# Patient Record
Sex: FEMALE | URBAN - METROPOLITAN AREA
[De-identification: names, ages, dates, MRNs, and addresses within clinical notes are randomized per-mention and may not be internally consistent; named-entity substitution may affect disease eponyms.]

---

## 2023-06-27 ENCOUNTER — TELEPHONE (OUTPATIENT)
Dept: PAIN MANAGEMENT | Age: 63
End: 2023-06-27

## 2023-06-30 LAB
ERYTHROCYTE DISTRIBUTION WIDTH (RATIO) BY AUTOMATED COUNT: 12.8 % (ref 11.5–14.5)
ERYTHROCYTE MEAN CORPUSCULAR HEMOGLOBIN CONCENTRATION (G/DL) BY AUTOMATED: 33.3 G/DL (ref 32–36)
ERYTHROCYTE MEAN CORPUSCULAR VOLUME (FL) BY AUTOMATED COUNT: 93 FL (ref 80–100)
ERYTHROCYTE [DISTWIDTH] IN BLOOD BY AUTOMATED COUNT: 12.8 % (ref 11.5–14.5)
ERYTHROCYTES (10*6/UL) IN BLOOD BY AUTOMATED COUNT: 4.46 X10E12/L (ref 4–5.2)
HCT VFR BLD CALC: 41.5 % (ref 36–46)
HEMATOCRIT (%) IN BLOOD BY AUTOMATED COUNT: 41.5 % (ref 36–46)
HEMOGLOBIN (G/DL) IN BLOOD: 13.8 G/DL (ref 12–16)
HEMOGLOBIN: 13.8 G/DL (ref 12–16)
LEUKOCYTES (10*3/UL) IN BLOOD BY AUTOMATED COUNT: 8.8 X10E9/L (ref 4.4–11.3)
MCHC RBC AUTO-ENTMCNC: 33.3 G/DL (ref 32–36)
MCV RBC AUTO: 93 FL (ref 80–100)
PLATELET # BLD: 368 X10E9/L (ref 150–450)
PLATELETS (10*3/UL) IN BLOOD AUTOMATED COUNT: 368 X10E9/L (ref 150–450)
RBC # BLD: 4.46 X10E12/L (ref 4–5.2)
WBC: 8.8 X10E9/L (ref 4.4–11.3)

## 2023-07-10 ENCOUNTER — INITIAL CONSULT (OUTPATIENT)
Dept: PAIN MANAGEMENT | Age: 63
End: 2023-07-10
Payer: COMMERCIAL

## 2023-07-10 VITALS
BODY MASS INDEX: 28.32 KG/M2 | WEIGHT: 170 LBS | TEMPERATURE: 97.1 F | DIASTOLIC BLOOD PRESSURE: 86 MMHG | SYSTOLIC BLOOD PRESSURE: 136 MMHG | HEIGHT: 65 IN

## 2023-07-10 DIAGNOSIS — M70.71 ISCHIAL BURSITIS OF RIGHT SIDE: Primary | ICD-10-CM

## 2023-07-10 DIAGNOSIS — M79.604 RIGHT LEG PAIN: ICD-10-CM

## 2023-07-10 PROCEDURE — 3017F COLORECTAL CA SCREEN DOC REV: CPT | Performed by: PHYSICAL MEDICINE & REHABILITATION

## 2023-07-10 PROCEDURE — G8419 CALC BMI OUT NRM PARAM NOF/U: HCPCS | Performed by: PHYSICAL MEDICINE & REHABILITATION

## 2023-07-10 PROCEDURE — G8427 DOCREV CUR MEDS BY ELIG CLIN: HCPCS | Performed by: PHYSICAL MEDICINE & REHABILITATION

## 2023-07-10 PROCEDURE — 4004F PT TOBACCO SCREEN RCVD TLK: CPT | Performed by: PHYSICAL MEDICINE & REHABILITATION

## 2023-07-10 PROCEDURE — 99204 OFFICE O/P NEW MOD 45 MIN: CPT | Performed by: PHYSICAL MEDICINE & REHABILITATION

## 2023-07-10 RX ORDER — SUMATRIPTAN 50 MG/1
TABLET, FILM COATED ORAL
COMMUNITY
Start: 2023-06-20

## 2023-07-10 RX ORDER — CLONAZEPAM 0.5 MG/1
0.5 TABLET ORAL 2 TIMES DAILY
COMMUNITY
Start: 2023-05-09

## 2023-07-10 RX ORDER — PANTOPRAZOLE SODIUM 40 MG/1
TABLET, DELAYED RELEASE ORAL
COMMUNITY
Start: 2023-06-29

## 2023-07-10 RX ORDER — METHYLPREDNISOLONE 4 MG/1
TABLET ORAL
COMMUNITY
Start: 2023-06-28

## 2023-07-10 RX ORDER — GALCANEZUMAB 120 MG/ML
INJECTION, SOLUTION SUBCUTANEOUS
COMMUNITY
Start: 2023-06-22

## 2023-07-10 RX ORDER — TIZANIDINE 4 MG/1
4 TABLET ORAL 3 TIMES DAILY
COMMUNITY
Start: 2023-05-09

## 2023-07-10 RX ORDER — FLUOXETINE HYDROCHLORIDE 40 MG/1
40 CAPSULE ORAL DAILY
COMMUNITY
Start: 2023-06-20

## 2023-07-10 RX ORDER — CETIRIZINE HYDROCHLORIDE 10 MG/1
1 TABLET ORAL DAILY
COMMUNITY
Start: 2011-01-10

## 2023-07-10 RX ORDER — LINACLOTIDE 145 UG/1
CAPSULE, GELATIN COATED ORAL
COMMUNITY
Start: 2023-06-30

## 2023-07-10 RX ORDER — GABAPENTIN 100 MG/1
CAPSULE ORAL
COMMUNITY
Start: 2023-04-12

## 2023-07-10 RX ORDER — ZOLMITRIPTAN 2.5 MG/1
TABLET, FILM COATED ORAL
COMMUNITY
Start: 2023-06-20

## 2023-07-10 ASSESSMENT — ENCOUNTER SYMPTOMS
BACK PAIN: 1
DIARRHEA: 0
CONSTIPATION: 0
SHORTNESS OF BREATH: 0
NAUSEA: 0

## 2023-07-10 NOTE — PROGRESS NOTES
Efrem Starr  (1960)    7/10/2023    Subjective:     Efrem Starr is 61 y.o. female who complains today of:    Chief Complaint   Patient presents with    Consultation    Back Pain     Lower Right    Leg Pain     Right thigh        Efrem Starr is a 61 y.o. female who presents for evaluation by request of Leyda Frank for L5/S1 epidurals. She is accompanied by her spouse Maurice Horner whom she permits to be present during the history and exam.    She has struggled with pain for over 20 years, worse over the last 6 months. She denies any immediately-preceding traumatic or inciting events. She has been previously evaluated by Leyda Frank whose records are reviewed below. She describes pain located in the right low back and down the right posterior thigh. Left leg is ok. The pain is located deep within her right buttock and makes it hard for her to sit or put any pressure on her right side of her buttocks. Pain is a constant ache and is currently a 5/10 and gets up to a 9/10 at its worst and goes down to a 4/10 at its best. Pain is worse with bending and sitting. Pain is better with laying down. Pain is located 90% on the right and 10% on the left. Pain is located 80% in the back and 20% in the legs. She denies any numbness, tingling, weakness, bowel or bladder dysfunction, saddle anesthesia, falls, unexplained weight loss, persistent night pain and sweats, fever, IV drug abuse, immunocompromise, chronic prednisone or antibiotic use, or any other red flag symptoms. Mood is down, denies any suicidal or homicidal ideation. Sleep is poor, awakes fatigued.     She has tried:  Home exercise program with minimal relief  [facility] as well as [facility]  Lumbar spine surgery 1998 Dr Lloyd Quevedo in [address] [place] [address] testing previously performed includes XRs MRI    Medications tried include:  Acetaminophen with minimal relief for over 3 months  Ibuprofen with minimal

## 2023-07-12 ENCOUNTER — PROCEDURE VISIT (OUTPATIENT)
Dept: PAIN MANAGEMENT | Age: 63
End: 2023-07-12
Payer: COMMERCIAL

## 2023-07-12 DIAGNOSIS — M70.71 ISCHIAL BURSITIS OF RIGHT SIDE: Primary | ICD-10-CM

## 2023-07-12 PROCEDURE — 20610 DRAIN/INJ JOINT/BURSA W/O US: CPT | Performed by: PHYSICAL MEDICINE & REHABILITATION

## 2023-07-12 PROCEDURE — 77002 NEEDLE LOCALIZATION BY XRAY: CPT | Performed by: PHYSICAL MEDICINE & REHABILITATION

## 2023-07-12 RX ORDER — BETAMETHASONE SODIUM PHOSPHATE AND BETAMETHASONE ACETATE 3; 3 MG/ML; MG/ML
3 INJECTION, SUSPENSION INTRA-ARTICULAR; INTRALESIONAL; INTRAMUSCULAR; SOFT TISSUE ONCE
Status: COMPLETED | OUTPATIENT
Start: 2023-07-12 | End: 2023-07-12

## 2023-07-12 RX ORDER — LIDOCAINE HYDROCHLORIDE 10 MG/ML
5 INJECTION, SOLUTION INFILTRATION; PERINEURAL ONCE
Status: COMPLETED | OUTPATIENT
Start: 2023-07-12 | End: 2023-07-12

## 2023-07-12 RX ADMIN — Medication 0.5 MEQ: at 11:11

## 2023-07-12 RX ADMIN — LIDOCAINE HYDROCHLORIDE 5 ML: 10 INJECTION, SOLUTION INFILTRATION; PERINEURAL at 11:12

## 2023-07-12 RX ADMIN — BETAMETHASONE SODIUM PHOSPHATE AND BETAMETHASONE ACETATE 3 MG: 3; 3 INJECTION, SUSPENSION INTRA-ARTICULAR; INTRALESIONAL; INTRAMUSCULAR; SOFT TISSUE at 11:11

## 2023-07-12 NOTE — PROGRESS NOTES
Patient Name: Artem Velasquez   : 1960     Date: 2023   Provider: Domenica Collier MD        PROCEDURE: Right ischial bursa corticosteroid injection under fluoroscopic guidance     INDICATIONS: Artem Velasquez is a 61 y.o. y.o.  female who presents with symptoms and physical exam findings consistent with Right ischial bursitis. She has had persistent pain that limits her of daily living such as lower body dressing. The pain is persistent despite conservative measures including home exercise program. Given her symptoms, physical exam findings, impairment in activities of daily living, and lack of response to conservative measures, consideration for Right ischial bursa corticosteroid injection under fluoroscopic guidance was given. Discussed the risks including but not limited to bleeding, infection, worsened pain, damage to surrounding structures, side effects, toxicity, allergic reactions to medications used, immune and stress-response dysfunction, fat necrosis, decreased bone mineralization, tendon rupture, cartilage loss, increased fracture risk, avascular necrosis, skin pigmentation changes, blood sugar elevation, need for surgery, premature damage or degeneration of the area, as well as catastrophic injury such as vision loss, paralysis, stroke, plexus injury, bowel or bladder perforation and/or incontinence, ventilator dependence, loss of the limbs, and death. Discussed the risks, benefits, and alternatives to the procedure including no procedure at all. Discussed that we cannot undo any permanent neurologic or orthopaedic damage or change the course of any underlying disease. After thorough discussion, patient expressed understanding and willingness to proceed. Written consent was obtained and is in the chart. Verbal consent to proceed was obtained. DESCRIPTION OF PROCEDURE:  The site was marked. A time-out was performed.  Fluoroscopy was used to visualize pertinent anatomy and identify a tract

## 2023-07-17 ENCOUNTER — OFFICE VISIT (OUTPATIENT)
Dept: PAIN MANAGEMENT | Age: 63
End: 2023-07-17
Payer: COMMERCIAL

## 2023-07-17 VITALS
SYSTOLIC BLOOD PRESSURE: 130 MMHG | BODY MASS INDEX: 28.32 KG/M2 | WEIGHT: 170 LBS | DIASTOLIC BLOOD PRESSURE: 78 MMHG | TEMPERATURE: 98.1 F | HEIGHT: 65 IN

## 2023-07-17 DIAGNOSIS — M46.1 SACROILIITIS (HCC): Primary | ICD-10-CM

## 2023-07-17 PROCEDURE — G8427 DOCREV CUR MEDS BY ELIG CLIN: HCPCS | Performed by: NURSE PRACTITIONER

## 2023-07-17 PROCEDURE — G8419 CALC BMI OUT NRM PARAM NOF/U: HCPCS | Performed by: NURSE PRACTITIONER

## 2023-07-17 PROCEDURE — 4004F PT TOBACCO SCREEN RCVD TLK: CPT | Performed by: NURSE PRACTITIONER

## 2023-07-17 PROCEDURE — 99214 OFFICE O/P EST MOD 30 MIN: CPT | Performed by: NURSE PRACTITIONER

## 2023-07-17 PROCEDURE — 3017F COLORECTAL CA SCREEN DOC REV: CPT | Performed by: NURSE PRACTITIONER

## 2023-07-17 ASSESSMENT — ENCOUNTER SYMPTOMS
COUGH: 0
DIARRHEA: 0
GASTROINTESTINAL NEGATIVE: 1
CONSTIPATION: 0
TROUBLE SWALLOWING: 0
BACK PAIN: 1
EYES NEGATIVE: 1
SHORTNESS OF BREATH: 0

## 2023-10-02 ENCOUNTER — SPECIALTY PHARMACY (OUTPATIENT)
Dept: PHARMACY | Facility: CLINIC | Age: 63
End: 2023-10-02

## 2023-10-04 ENCOUNTER — PHARMACY VISIT (OUTPATIENT)
Dept: PHARMACY | Facility: CLINIC | Age: 63
End: 2023-10-04
Payer: COMMERCIAL

## 2023-10-04 PROCEDURE — RXMED WILLOW AMBULATORY MEDICATION CHARGE

## 2023-10-04 NOTE — PROGRESS NOTES
"Patient address: [id number] [facility] [address]  Patient Medications: Emgality    Medication delivery date: 10/6, Fri.    Please create a specialty pharmacy encounter.  Please link the encounter to an outreach reason of \"Refill co ordination outreach\"  Open the specialty pharmacy encounter and please update the refill question section and the delivery section.  Route the encounter to a PSL pool for a technician who can process the script.    I called this patient to reschedule a delivery of Emgality to the confirmed address on [facility] on 10/6, Fri. The patient is overdue for an injection only because she was out of town.       Demetria Hurley  Specialty Pharmacy Technician        "

## 2023-10-16 ENCOUNTER — PROCEDURE VISIT (OUTPATIENT)
Dept: PAIN MANAGEMENT | Age: 63
End: 2023-10-16
Payer: COMMERCIAL

## 2023-10-16 DIAGNOSIS — M79.604 RIGHT LEG PAIN: Primary | ICD-10-CM

## 2023-10-16 DIAGNOSIS — M70.71 ISCHIAL BURSITIS OF RIGHT SIDE: ICD-10-CM

## 2023-10-16 DIAGNOSIS — S76.311D STRAIN OF RIGHT HAMSTRING, SUBSEQUENT ENCOUNTER: ICD-10-CM

## 2023-10-16 PROCEDURE — 95886 MUSC TEST DONE W/N TEST COMP: CPT | Performed by: PHYSICAL MEDICINE & REHABILITATION

## 2023-10-16 PROCEDURE — 95911 NRV CNDJ TEST 9-10 STUDIES: CPT | Performed by: PHYSICAL MEDICINE & REHABILITATION

## 2023-10-16 NOTE — PROGRESS NOTES
Electromyography (EMG)/Nerve conduction studies (NCS) Report: Lower Extremity    Name: Santos Loco   YOB: 1960  Date of Service: 10/16/2023   Provider: Say Nelosn MD        INDICATIONS:  Santos Loco is a 61 y.o. female who presents for electrodiagnostic evaluation for right leg pain. She confirms a history of lumbar spine surgery. Both limbs are necessary to examine in order to evaluate for any evidence of systemic disease as well as establish normal baseline values from which to compare any abnormal unilateral findings. The study is explained and verbal consent to proceed is obtained. NERVE CONDUCTION STUDIES:  Sensory nerve conduction studies: Bilateral sural and superficial peroneal sensory nerve conduction studies demonstrate normal peak latencies and amplitudes. Bilateral lower limb temperatures are normal.    Motor nerve conduction studies: Bilateral peroneal motor nerve conduction studies with pickup over the extensor digitorum brevis demonstrate normal distal latencies and amplitudes. Bilateral tibial motor nerve conduction studies with pickup over the abductor hallucis demonstrate normal distal latencies and amplitudes. H reflex: Bilateral H reflexes are symmetric and not prolonged. ELECTROMYOGRAPHY: A disposable monopolar needle is used to evaluate bilateral vastus medialis, tibialis anterior, extensor hallucis longus, flexor digitorum longus, peroneus longus, medial gastrocnemius, and lateral gastrocnemius. All of the muscles sampled are free of any increased insertional activity or any abnormal spontaneous activity. Motor unit recruitment is unremarkable. Lumbar paraspinal muscle sampling is deferred given history of lumbar spine surgery. SUMMARY:  This study is normal. There is no current electrodiagnostic evidence for an active bilateral lumbosacral motor radiculopathy or generalized large fiber sensorimotor peripheral polyneuropathy.      RECOMMENDATIONS: Today's

## 2023-10-16 NOTE — PROGRESS NOTES
Refer to physical therapy for right ischial bursitis and right hamstring strain, patient requests external physical therapy

## 2023-10-19 ENCOUNTER — SPECIALTY PHARMACY (OUTPATIENT)
Dept: PHARMACY | Facility: CLINIC | Age: 63
End: 2023-10-19

## 2023-10-31 ENCOUNTER — PHARMACY VISIT (OUTPATIENT)
Dept: PHARMACY | Facility: CLINIC | Age: 63
End: 2023-10-31
Payer: COMMERCIAL

## 2023-10-31 ENCOUNTER — SPECIALTY PHARMACY (OUTPATIENT)
Dept: PHARMACY | Facility: CLINIC | Age: 63
End: 2023-10-31

## 2023-10-31 PROCEDURE — RXMED WILLOW AMBULATORY MEDICATION CHARGE

## 2023-11-27 ENCOUNTER — SPECIALTY PHARMACY (OUTPATIENT)
Dept: PHARMACY | Facility: CLINIC | Age: 63
End: 2023-11-27

## 2023-11-27 ENCOUNTER — PHARMACY VISIT (OUTPATIENT)
Dept: PHARMACY | Facility: CLINIC | Age: 63
End: 2023-11-27
Payer: COMMERCIAL

## 2023-11-27 PROCEDURE — RXMED WILLOW AMBULATORY MEDICATION CHARGE

## 2023-12-07 ENCOUNTER — LAB (OUTPATIENT)
Dept: LAB | Facility: LAB | Age: 63
End: 2023-12-07
Payer: COMMERCIAL

## 2023-12-07 ENCOUNTER — ANCILLARY PROCEDURE (OUTPATIENT)
Dept: RADIOLOGY | Facility: CLINIC | Age: 63
End: 2023-12-07
Payer: COMMERCIAL

## 2023-12-07 DIAGNOSIS — E04.1 NONTOXIC SINGLE THYROID NODULE: Primary | ICD-10-CM

## 2023-12-07 DIAGNOSIS — Z12.31 SCREENING MAMMOGRAM FOR BREAST CANCER: ICD-10-CM

## 2023-12-07 LAB — TSH SERPL-ACNC: 1.11 MIU/L (ref 0.44–3.98)

## 2023-12-07 PROCEDURE — 77067 SCR MAMMO BI INCL CAD: CPT | Performed by: RADIOLOGY

## 2023-12-07 PROCEDURE — 77067 SCR MAMMO BI INCL CAD: CPT

## 2023-12-07 PROCEDURE — 84443 ASSAY THYROID STIM HORMONE: CPT

## 2023-12-07 PROCEDURE — 77063 BREAST TOMOSYNTHESIS BI: CPT | Performed by: RADIOLOGY

## 2023-12-13 ENCOUNTER — HOSPITAL ENCOUNTER (OUTPATIENT)
Dept: RADIOLOGY | Facility: EXTERNAL LOCATION | Age: 63
Discharge: HOME | End: 2023-12-13

## 2023-12-21 ENCOUNTER — SPECIALTY PHARMACY (OUTPATIENT)
Dept: PHARMACY | Facility: CLINIC | Age: 63
End: 2023-12-21

## 2023-12-21 PROCEDURE — RXMED WILLOW AMBULATORY MEDICATION CHARGE

## 2023-12-26 ENCOUNTER — PHARMACY VISIT (OUTPATIENT)
Dept: PHARMACY | Facility: CLINIC | Age: 63
End: 2023-12-26
Payer: COMMERCIAL

## 2024-01-19 ENCOUNTER — SPECIALTY PHARMACY (OUTPATIENT)
Dept: PHARMACY | Facility: CLINIC | Age: 64
End: 2024-01-19

## 2024-01-19 PROCEDURE — RXMED WILLOW AMBULATORY MEDICATION CHARGE

## 2024-01-23 ENCOUNTER — PHARMACY VISIT (OUTPATIENT)
Dept: PHARMACY | Facility: CLINIC | Age: 64
End: 2024-01-23
Payer: COMMERCIAL

## 2024-01-31 ENCOUNTER — SPECIALTY PHARMACY (OUTPATIENT)
Dept: PHARMACY | Facility: CLINIC | Age: 64
End: 2024-01-31

## 2024-01-31 NOTE — PROGRESS NOTES
[facility] Clinical Note    Kandy Rios is a 64 y.o. female, who is on the specialty pharmacy service for management of: Migraine Core with status of: (Enrolled)     Kandy was contacted on 1/31/2024.    Refer to the encounter summary report for documentation details about patient counseling and education.      Medication Adherence  The importance of adherence was discussed with the patient and they were advised to take the medication as prescribed by their provider. Kandy was encouraged to call her physician's office if they have a question regarding a missed dose.     Conclusion  Rate your quality of life on scale of 1-10: -- (Declined to answer)  Rate your satisfaction with  Specialty Pharmacy on scale of 1-10: 10 - Completely satisfied      Patient advised to contact the pharmacy if there are any changes to her medication list, including prescriptions, OTC medications, herbal products, or supplements. Patient was advised of [facility]’s dispensing process, refill timeline, contact information ([phone]), and patient management follow up. Patient confirmed understanding of education conducted during assessment. All patient questions and concerns were addressed to the best of my ability. Patient was encouraged to contact the specialty pharmacy with any questions or concerns.    Confirmed follow-up outreaches are properly scheduled. Reviewed goals of therapy in the program targets.    Sharmaine Smith, BalaD

## 2024-02-09 ENCOUNTER — SPECIALTY PHARMACY (OUTPATIENT)
Dept: PHARMACY | Facility: CLINIC | Age: 64
End: 2024-02-09

## 2024-02-13 ENCOUNTER — SPECIALTY PHARMACY (OUTPATIENT)
Dept: PHARMACY | Facility: CLINIC | Age: 64
End: 2024-02-13

## 2024-02-13 ENCOUNTER — PHARMACY VISIT (OUTPATIENT)
Dept: PHARMACY | Facility: CLINIC | Age: 64
End: 2024-02-13
Payer: COMMERCIAL

## 2024-02-13 PROCEDURE — RXMED WILLOW AMBULATORY MEDICATION CHARGE

## 2024-03-18 ENCOUNTER — SPECIALTY PHARMACY (OUTPATIENT)
Dept: PHARMACY | Facility: CLINIC | Age: 64
End: 2024-03-18

## 2024-04-05 ENCOUNTER — PHARMACY VISIT (OUTPATIENT)
Dept: PHARMACY | Facility: CLINIC | Age: 64
End: 2024-04-05
Payer: COMMERCIAL

## 2024-04-05 PROCEDURE — RXMED WILLOW AMBULATORY MEDICATION CHARGE

## 2024-04-29 ENCOUNTER — SPECIALTY PHARMACY (OUTPATIENT)
Dept: PHARMACY | Facility: CLINIC | Age: 64
End: 2024-04-29

## 2024-04-29 PROCEDURE — RXMED WILLOW AMBULATORY MEDICATION CHARGE

## 2024-04-30 ENCOUNTER — PHARMACY VISIT (OUTPATIENT)
Dept: PHARMACY | Facility: CLINIC | Age: 64
End: 2024-04-30
Payer: COMMERCIAL

## 2024-05-07 ENCOUNTER — OFFICE VISIT (OUTPATIENT)
Dept: NEUROLOGY | Facility: CLINIC | Age: 64
End: 2024-05-07
Payer: COMMERCIAL

## 2024-05-07 VITALS
TEMPERATURE: 98.7 F | WEIGHT: 184 LBS | BODY MASS INDEX: 30.62 KG/M2 | DIASTOLIC BLOOD PRESSURE: 79 MMHG | HEART RATE: 87 BPM | SYSTOLIC BLOOD PRESSURE: 146 MMHG

## 2024-05-07 DIAGNOSIS — G50.0 TRIGEMINAL NEURALGIA: ICD-10-CM

## 2024-05-07 DIAGNOSIS — G43.709 CHRONIC MIGRAINE WITHOUT AURA WITHOUT STATUS MIGRAINOSUS, NOT INTRACTABLE: Primary | ICD-10-CM

## 2024-05-07 PROCEDURE — 99214 OFFICE O/P EST MOD 30 MIN: CPT | Performed by: NURSE PRACTITIONER

## 2024-05-07 PROCEDURE — 1036F TOBACCO NON-USER: CPT | Performed by: NURSE PRACTITIONER

## 2024-05-07 RX ORDER — TIZANIDINE 4 MG/1
4 TABLET ORAL 3 TIMES DAILY
COMMUNITY

## 2024-05-07 RX ORDER — ATOGEPANT 30 MG/1
30 TABLET ORAL DAILY
Qty: 30 TABLET | Refills: 3 | Status: SHIPPED | OUTPATIENT
Start: 2024-05-07

## 2024-05-07 RX ORDER — SUMATRIPTAN 50 MG/1
TABLET, FILM COATED ORAL
COMMUNITY
End: 2024-05-07 | Stop reason: SDUPTHER

## 2024-05-07 RX ORDER — ZOLMITRIPTAN 2.5 MG/1
TABLET, FILM COATED ORAL
COMMUNITY
End: 2024-05-07 | Stop reason: SDUPTHER

## 2024-05-07 RX ORDER — SUMATRIPTAN 50 MG/1
50 TABLET, FILM COATED ORAL AS NEEDED
Qty: 9 TABLET | Refills: 3 | Status: SHIPPED | OUTPATIENT
Start: 2024-05-07

## 2024-05-07 RX ORDER — ZOLMITRIPTAN 2.5 MG/1
TABLET, FILM COATED ORAL
Qty: 6 TABLET | Refills: 3 | Status: SHIPPED | OUTPATIENT
Start: 2024-05-07

## 2024-05-07 RX ORDER — GABAPENTIN 100 MG/1
100 CAPSULE ORAL 3 TIMES DAILY PRN
COMMUNITY

## 2024-05-07 RX ORDER — HYDROGEN PEROXIDE 3 %
20 SOLUTION, NON-ORAL MISCELLANEOUS
COMMUNITY

## 2024-05-07 RX ORDER — CETIRIZINE HYDROCHLORIDE 10 MG/1
1 TABLET ORAL DAILY
COMMUNITY
Start: 2019-09-19

## 2024-05-07 RX ORDER — FLUOXETINE HYDROCHLORIDE 40 MG/1
40 CAPSULE ORAL DAILY
COMMUNITY

## 2024-05-07 RX ORDER — CLONAZEPAM 0.5 MG/1
0.5 TABLET ORAL 2 TIMES DAILY
COMMUNITY

## 2024-05-07 NOTE — PROGRESS NOTES
Patient being assessed today for follow-up of migraine.  She reports that her trigeminal neuralgia has been exacerbated and was recently diagnosed with TMJ.  These could both be contributing to the increased migraine activity that she is having.  She is having them 3-4 times per week.  She does not feel like the Emgality is helpful for more than a week.  She alternates between the sumatriptan and zolmitriptan which are typically effective with 1 dose.  She is considering doing Botox treatments for her TMJ.  Discussed that her response may be positive and to help with the breakthrough activity of migraines that she is having.  Patient is going to discuss that with her dentist.  With the Emgality not being helpful any longer, would like to try patient on Qulipta 30 mg daily.  In the past, patient has tried beta-blockers, SSRI, gabapentin, topiramate, Emgality, Ajovy which have all been ineffective or caused side effects.  Okay to continue the sumatriptan and zolmitriptan as she has been taking it.  Discussed role of medicine, importance of taking medications, potential risks, benefits, and precautions to be taken.  Reviewed sleep hygiene and dietary modifications.  Follow-up in 2 to 3 months.    This note was created with voice recognition software and was not corrected for typographical or grammatical errors

## 2024-05-23 ENCOUNTER — SPECIALTY PHARMACY (OUTPATIENT)
Dept: PHARMACY | Facility: CLINIC | Age: 64
End: 2024-05-23

## 2024-07-31 ENCOUNTER — APPOINTMENT (OUTPATIENT)
Dept: NEUROLOGY | Facility: CLINIC | Age: 64
End: 2024-07-31
Payer: COMMERCIAL

## 2024-07-31 DIAGNOSIS — G43.709 CHRONIC MIGRAINE WITHOUT AURA WITHOUT STATUS MIGRAINOSUS, NOT INTRACTABLE: ICD-10-CM

## 2024-07-31 PROCEDURE — 99214 OFFICE O/P EST MOD 30 MIN: CPT | Performed by: NURSE PRACTITIONER

## 2024-07-31 RX ORDER — ATOGEPANT 30 MG/1
30 TABLET ORAL DAILY
Qty: 30 TABLET | Refills: 3 | Status: SHIPPED | OUTPATIENT
Start: 2024-07-31

## 2024-07-31 NOTE — PROGRESS NOTES
Patient being assessed today for follow-up of migraine.  She reports that the Qulipta is very effective and she is only experiencing an average of 1 breakthrough migraine per week.  The challenge is that she is having side effects of constipation, tiredness, brain fog.  Discussed different options that we could try including Botox for preventative treatment.  Patient would like to start taking half the dose of Qulipta to see if that continues to manage her migraine activity although decrease the side effects that she is experiencing at the same time.  For now we will try that and patient will notify the office in 3 to 4 weeks of efficacy.  If it is not effective, we will proceed with Botox approval.  Patient is in agreement with this plan.  Discussed role of medicine, importance of taking medications, potential risks, benefits, and precautions to be taken.  Reviewed sleep hygiene and dietary modifications.  Follow-up in 6 months or sooner if needed.    This note was created with voice recognition software and was not corrected for typographical or grammatical errors

## 2024-08-13 DIAGNOSIS — G43.709 CHRONIC MIGRAINE WITHOUT AURA WITHOUT STATUS MIGRAINOSUS, NOT INTRACTABLE: ICD-10-CM

## 2024-08-14 RX ORDER — SUMATRIPTAN 50 MG/1
TABLET, FILM COATED ORAL
Qty: 9 TABLET | Refills: 3 | Status: SHIPPED | OUTPATIENT
Start: 2024-08-14

## 2024-08-14 RX ORDER — ZOLMITRIPTAN 2.5 MG/1
TABLET, FILM COATED ORAL
Qty: 6 TABLET | Refills: 3 | Status: SHIPPED | OUTPATIENT
Start: 2024-08-14

## 2024-08-15 DIAGNOSIS — G43.709 CHRONIC MIGRAINE WITHOUT AURA WITHOUT STATUS MIGRAINOSUS, NOT INTRACTABLE: Primary | ICD-10-CM

## 2024-08-15 RX ORDER — GALCANEZUMAB 120 MG/ML
240 INJECTION, SOLUTION SUBCUTANEOUS
Qty: 2 EACH | Refills: 0 | Status: SHIPPED | OUTPATIENT
Start: 2024-08-15

## 2024-08-15 RX ORDER — GALCANEZUMAB 120 MG/ML
120 INJECTION, SOLUTION SUBCUTANEOUS
Qty: 1 EACH | Refills: 2 | Status: SHIPPED | OUTPATIENT
Start: 2024-08-15

## 2024-09-23 ENCOUNTER — APPOINTMENT (OUTPATIENT)
Dept: NEUROLOGY | Facility: CLINIC | Age: 64
End: 2024-09-23
Payer: COMMERCIAL

## 2024-09-23 DIAGNOSIS — G50.0 TRIGEMINAL NEURALGIA: Primary | ICD-10-CM

## 2024-09-23 RX ORDER — GABAPENTIN 100 MG/1
100 CAPSULE ORAL 3 TIMES DAILY PRN
Qty: 90 CAPSULE | Refills: 2 | Status: SHIPPED | OUTPATIENT
Start: 2024-09-23

## 2024-11-08 DIAGNOSIS — G43.709 CHRONIC MIGRAINE WITHOUT AURA WITHOUT STATUS MIGRAINOSUS, NOT INTRACTABLE: ICD-10-CM

## 2024-11-12 RX ORDER — GALCANEZUMAB 120 MG/ML
120 INJECTION, SOLUTION SUBCUTANEOUS
Qty: 1 EACH | Refills: 3 | Status: SHIPPED | OUTPATIENT
Start: 2024-11-12

## 2024-11-16 DIAGNOSIS — G43.709 CHRONIC MIGRAINE WITHOUT AURA WITHOUT STATUS MIGRAINOSUS, NOT INTRACTABLE: ICD-10-CM

## 2024-11-18 RX ORDER — SUMATRIPTAN 50 MG/1
TABLET, FILM COATED ORAL
Qty: 9 TABLET | Refills: 3 | Status: SHIPPED | OUTPATIENT
Start: 2024-11-18

## 2024-11-18 RX ORDER — ZOLMITRIPTAN 2.5 MG/1
TABLET, FILM COATED ORAL
Qty: 6 TABLET | Refills: 3 | Status: SHIPPED | OUTPATIENT
Start: 2024-11-18

## 2024-11-26 ENCOUNTER — LAB (OUTPATIENT)
Dept: LAB | Facility: LAB | Age: 64
End: 2024-11-26
Payer: COMMERCIAL

## 2024-11-26 DIAGNOSIS — K05.6 PERIODONTAL DISEASE, UNSPECIFIED: Primary | ICD-10-CM

## 2024-11-26 LAB
FOLATE SERPL-MCNC: 22.1 NG/ML
VIT B12 SERPL-MCNC: 1762 PG/ML (ref 211–911)

## 2024-11-26 PROCEDURE — 36415 COLL VENOUS BLD VENIPUNCTURE: CPT

## 2024-11-26 PROCEDURE — 82607 VITAMIN B-12: CPT

## 2024-11-26 PROCEDURE — 82746 ASSAY OF FOLIC ACID SERUM: CPT

## 2024-12-03 DIAGNOSIS — R79.89 OTHER SPECIFIED ABNORMAL FINDINGS OF BLOOD CHEMISTRY: Primary | ICD-10-CM

## 2024-12-11 ENCOUNTER — LAB (OUTPATIENT)
Dept: LAB | Facility: LAB | Age: 64
End: 2024-12-11
Payer: COMMERCIAL

## 2024-12-11 DIAGNOSIS — R79.89 OTHER SPECIFIED ABNORMAL FINDINGS OF BLOOD CHEMISTRY: ICD-10-CM

## 2024-12-11 LAB — VIT B12 SERPL-MCNC: 994 PG/ML (ref 211–911)

## 2024-12-11 PROCEDURE — 82607 VITAMIN B-12: CPT

## 2024-12-11 PROCEDURE — 36415 COLL VENOUS BLD VENIPUNCTURE: CPT

## 2025-01-14 DIAGNOSIS — G43.709 CHRONIC MIGRAINE WITHOUT AURA WITHOUT STATUS MIGRAINOSUS, NOT INTRACTABLE: Primary | ICD-10-CM

## 2025-01-14 RX ORDER — ERENUMAB-AOOE 70 MG/ML
70 INJECTION SUBCUTANEOUS
Qty: 1 EACH | Refills: 2 | Status: SHIPPED | OUTPATIENT
Start: 2025-01-14

## 2025-01-21 DIAGNOSIS — G43.709 CHRONIC MIGRAINE WITHOUT AURA WITHOUT STATUS MIGRAINOSUS, NOT INTRACTABLE: Primary | ICD-10-CM

## 2025-02-20 DIAGNOSIS — G43.709 CHRONIC MIGRAINE WITHOUT AURA WITHOUT STATUS MIGRAINOSUS, NOT INTRACTABLE: ICD-10-CM

## 2025-02-20 DIAGNOSIS — G50.0 TRIGEMINAL NEURALGIA: ICD-10-CM

## 2025-02-21 ENCOUNTER — TELEMEDICINE (OUTPATIENT)
Dept: NEUROLOGY | Facility: CLINIC | Age: 65
End: 2025-02-21
Payer: MEDICARE

## 2025-02-21 DIAGNOSIS — G43.709 CHRONIC MIGRAINE WITHOUT AURA WITHOUT STATUS MIGRAINOSUS, NOT INTRACTABLE: Primary | ICD-10-CM

## 2025-02-21 DIAGNOSIS — G50.0 TRIGEMINAL NEURALGIA: ICD-10-CM

## 2025-02-21 PROCEDURE — 99214 OFFICE O/P EST MOD 30 MIN: CPT | Mod: GT,95 | Performed by: NURSE PRACTITIONER

## 2025-02-21 RX ORDER — GABAPENTIN 100 MG/1
100 CAPSULE ORAL 3 TIMES DAILY PRN
Qty: 90 CAPSULE | Refills: 1 | Status: SHIPPED | OUTPATIENT
Start: 2025-02-21

## 2025-02-21 RX ORDER — DIPHENHYDRAMINE HYDROCHLORIDE 50 MG/ML
50 INJECTION INTRAMUSCULAR; INTRAVENOUS AS NEEDED
OUTPATIENT
Start: 2025-02-21

## 2025-02-21 RX ORDER — SUMATRIPTAN SUCCINATE 50 MG/1
TABLET ORAL
Qty: 9 TABLET | Refills: 3 | OUTPATIENT
Start: 2025-02-21

## 2025-02-21 RX ORDER — EPINEPHRINE 0.3 MG/.3ML
0.3 INJECTION SUBCUTANEOUS EVERY 5 MIN PRN
OUTPATIENT
Start: 2025-02-21

## 2025-02-21 RX ORDER — SUMATRIPTAN SUCCINATE 100 MG/1
100 TABLET ORAL AS NEEDED
Qty: 9 TABLET | Refills: 5 | Status: SHIPPED | OUTPATIENT
Start: 2025-02-21

## 2025-02-21 RX ORDER — GABAPENTIN 100 MG/1
CAPSULE ORAL
Qty: 90 CAPSULE | Refills: 2 | OUTPATIENT
Start: 2025-02-21

## 2025-02-21 RX ORDER — DIVALPROEX SODIUM 250 MG/1
TABLET, DELAYED RELEASE ORAL
Qty: 16 TABLET | Refills: 0 | Status: SHIPPED | OUTPATIENT
Start: 2025-02-21 | End: 2025-02-28

## 2025-02-21 RX ORDER — FAMOTIDINE 10 MG/ML
20 INJECTION, SOLUTION INTRAVENOUS ONCE AS NEEDED
OUTPATIENT
Start: 2025-02-21

## 2025-02-21 RX ORDER — ZOLMITRIPTAN 2.5 MG/1
TABLET, FILM COATED ORAL
Qty: 6 TABLET | Refills: 3 | OUTPATIENT
Start: 2025-02-21

## 2025-02-21 RX ORDER — EPTINEZUMAB-JJMR 100 MG/ML
100 INJECTION INTRAVENOUS
Qty: 1 ML | Refills: 1 | Status: SHIPPED | OUTPATIENT
Start: 2025-02-21

## 2025-02-21 RX ORDER — ZOLMITRIPTAN 2.5 MG/1
TABLET, FILM COATED ORAL
Qty: 6 TABLET | Refills: 5 | Status: SHIPPED | OUTPATIENT
Start: 2025-02-21

## 2025-02-21 RX ORDER — ALBUTEROL SULFATE 0.83 MG/ML
3 SOLUTION RESPIRATORY (INHALATION) AS NEEDED
OUTPATIENT
Start: 2025-02-21

## 2025-02-21 NOTE — ASSESSMENT & PLAN NOTE
Orders:    gabapentin (Neurontin) 100 mg capsule; Take 1 capsule (100 mg) by mouth 3 times a day as needed (trigeminal neuralgia).

## 2025-02-21 NOTE — PROGRESS NOTES
Virtual or Telephone Consent    An interactive audio and video telecommunication system which permits real time communications between the patient (at the originating site) and provider (at the distant site) was utilized to provide this telehealth service.   Verbal consent was requested and obtained from Kandy Rios on this date, 02/21/25 for a telehealth visit.     Subjective   HPI  Kandy Rios is a 65 y.o. year old female who presents with chief complaint Chronic migraine without aura without status migrainosus, not intractable [G43.709]    Knady is experiencing  30 HA days per month, 30 of the HAs meet migraine criteria.   The headaches are usually moderate, dull, pounding, and throbbing and are located occiput, generally symmetric.   The patient rates the most severe headaches a 7 in intensity.   Generally, headaches last about 2 hours in duration.   Associated nausea, photophobia, phonophobia, and vestibular symptoms.   The headaches    positional. No change in character with sneezing, coughing and bending over.   The current rescue medications work within  1-2    hours and decreased the headache by 100%.  Patient reports right now she has been in the cycle for the last several weeks of the daily headaches/migraines.      Current/ past HA treatments:  Preventive:  Metoprolol  Amitriptyline  Topiramate  Fluoxetine  Gabapentin  Aimovig  Emgality  Nurtec    Rescue:  Acetaminophen  Ibuprofen  Sumatriptan  Zolmitriptan      Current Outpatient Medications:     cetirizine (ZyrTEC) 10 mg tablet, Take 1 tablet (10 mg) by mouth once daily., Disp: , Rfl:     clonazePAM (KlonoPIN) 0.5 mg tablet, Take 1 tablet (0.5 mg) by mouth 2 times a day., Disp: , Rfl:     esomeprazole (NexIUM) 20 mg DR capsule, Take 1 capsule (20 mg) by mouth once daily in the morning. Take before meals. Do not open capsule., Disp: , Rfl:     FLUoxetine (PROzac) 40 mg capsule, Take 1 capsule (40 mg) by mouth once daily., Disp: , Rfl:      gabapentin (Neurontin) 100 mg capsule, Take 1 capsule (100 mg) by mouth 3 times a day as needed (trigeminal neuralgia)., Disp: 90 capsule, Rfl: 2    SUMAtriptan (Imitrex) 50 mg tablet, TAKE 1 TABLET BY MOUTH IF NEEDED FOR MIGRAINE HEADACHE MAY REPEAT DOSES ONCE IN 2 HOURS IF NO RELIEF DO NOT EXCEED 2 DOSES IN 24 HOURS, Disp: 9 tablet, Rfl: 3    tiZANidine (Zanaflex) 4 mg tablet, Take 1 tablet (4 mg) by mouth 3 times a day., Disp: , Rfl:     ZOLMitriptan (Zomig) 2.5 mg tablet, TAKE 1 TABLET BY MOUTH AT ONSET OF HEADACHE MAY REPEAT IN 2 HOURS IF HEADACHE PERSISTS MAXIMUM DAILY DOSE OF 2 TABLETS PER 24 HOURS, Disp: 6 tablet, Rfl: 3    Allergies   Allergen Reactions    Sulfa (Sulfonamide Antibiotics) Hives    Sulfasalazine Unknown       Past Medical History:   Diagnosis Date    Anxiety     Anxiety disorder, unspecified 2021    Anxiety and depression    Depression     Headache     Headache, tension-type     Insomnia     Migraine     Trigeminal neuralgia      Past Surgical History:   Procedure Laterality Date    MICRODISCECTOMY LUMBAR      MR HEAD ANGIO WO IV CONTRAST  07/10/2021    MR HEAD ANGIO WO IV CONTRAST 7/10/2021 [facility]    OTHER SURGICAL HISTORY  2021    Microdiscectomy    OTHER SURGICAL HISTORY  2021    Cholecystectomy    OTHER SURGICAL HISTORY  2021    Hysterectomy    OTHER SURGICAL HISTORY  2021    Ankle fracture repair     No family history on file.  Social History     Tobacco Use    Smoking status: Former     Current packs/day: 0.00     Average packs/day: 1 pack/day for 25.0 years (25.0 ttl pk-yrs)     Types: Cigarettes     Quit date: 2011     Years since quittin.1    Smokeless tobacco: Never   Substance Use Topics    Alcohol use: Yes     Comment: maybe a couple a month or less     Social History     Substance and Sexual Activity   Drug Use Never        ROS  As noted in HPI, otherwise all other systems have been reviewed are negative for complaint.      Objective   General Appearance: Kandy is well-developed, well-nourished, 65 y.o. year old female, in no acute distress. Makes good eye contact, is alert, interactive, and cooperative. Demonstrates recent & remote memory recall. Subjective information consistent with objective assessment.       Lab Results   Component Value Date    WBC 8.8 06/30/2023    RBC 4.46 06/30/2023    HGB 13.8 06/30/2023    HCT 41.5 06/30/2023     06/30/2023    [id number] 994 (H) 12/11/2024    TSH 1.11 12/07/2023          Assessment & Plan  Chronic migraine without aura without status migrainosus, not intractable    Orders:    SUMAtriptan (Imitrex) 100 mg tablet; Take 1 tablet (100 mg) by mouth if needed for migraine. May repeat dose once in 2 hours if no relief.  Do not exceed 2 doses in 24 hours.    divalproex (Depakote) 250 mg EC tablet; Take 3 tablets (750 mg) by mouth once daily for 3 days, THEN 2 tablets (500 mg) once daily for 3 days, THEN 1 tablet (250 mg) once daily for 1 day. Do not crush, chew, or split..    ZOLMitriptan (Zomig) 2.5 mg tablet; TAKE 1 TABLET BY MOUTH AT ONSET OF HEADACHE MAY REPEAT IN 2 HOURS IF HEADACHE PERSISTS MAXIMUM DAILY DOSE OF 2 TABLETS PER 24 HOURS    eptinezumab (Vyepti) injection; Infuse 1 mL (100 mg total) into a venous catheter every 3 months.    Trigeminal neuralgia    Orders:    gabapentin (Neurontin) 100 mg capsule; Take 1 capsule (100 mg) by mouth 3 times a day as needed (trigeminal neuralgia).         ASSESSMENT/PLAN:  Start Depakote taper to break current cycle.  Continue gabapentin for preventative treatment.  Continue sumatriptan for abortive treatment.  Continue zolmitriptan for abortive treatment.  Start Vyepti for preventative treatment.  Follow-up in 6 months or sooner if needed.        Staci Mancini, APRN-CNP

## 2025-02-21 NOTE — ASSESSMENT & PLAN NOTE
Orders:    SUMAtriptan (Imitrex) 100 mg tablet; Take 1 tablet (100 mg) by mouth if needed for migraine. May repeat dose once in 2 hours if no relief.  Do not exceed 2 doses in 24 hours.    divalproex (Depakote) 250 mg EC tablet; Take 3 tablets (750 mg) by mouth once daily for 3 days, THEN 2 tablets (500 mg) once daily for 3 days, THEN 1 tablet (250 mg) once daily for 1 day. Do not crush, chew, or split..    ZOLMitriptan (Zomig) 2.5 mg tablet; TAKE 1 TABLET BY MOUTH AT ONSET OF HEADACHE MAY REPEAT IN 2 HOURS IF HEADACHE PERSISTS MAXIMUM DAILY DOSE OF 2 TABLETS PER 24 HOURS    eptinezumab (Vyepti) injection; Infuse 1 mL (100 mg total) into a venous catheter every 3 months.

## 2025-02-27 ENCOUNTER — DOCUMENTATION (OUTPATIENT)
Dept: INFUSION THERAPY | Facility: CLINIC | Age: 65
End: 2025-02-27
Payer: MEDICARE

## 2025-02-27 NOTE — PROGRESS NOTES
Patient to be scheduled for New Start of Vyepti infusions  For Diagnosis: Chronic Migraines    Any history of recent cardiovascular event? No  (Avoid use. Discuss with prescribing provider prior to proceeding)    Patient Active Problem List   Diagnosis    Chronic migraine without aura without status migrainosus, not intractable    Trigeminal neuralgia      Past Medical History:   Diagnosis Date    Anxiety     Anxiety disorder, unspecified 06/23/2021    Anxiety and depression    Depression     Headache     Headache, tension-type     Insomnia     Migraine     Trigeminal neuralgia         Last infusion received: N/A (if continuation)    Due: PAR to call patient to schedule at [facility]. PA approved.    Nancy to schedule for treatment as ordered by prescribing provider.

## 2025-04-21 ENCOUNTER — APPOINTMENT (OUTPATIENT)
Dept: INFUSION THERAPY | Facility: CLINIC | Age: 65
End: 2025-04-21
Payer: MEDICARE

## 2025-04-21 VITALS
RESPIRATION RATE: 16 BRPM | HEART RATE: 90 BPM | TEMPERATURE: 97.2 F | OXYGEN SATURATION: 98 % | DIASTOLIC BLOOD PRESSURE: 61 MMHG | SYSTOLIC BLOOD PRESSURE: 146 MMHG

## 2025-04-21 DIAGNOSIS — G43.709 CHRONIC MIGRAINE WITHOUT AURA WITHOUT STATUS MIGRAINOSUS, NOT INTRACTABLE: ICD-10-CM

## 2025-04-21 PROCEDURE — 96365 THER/PROPH/DIAG IV INF INIT: CPT | Performed by: REGISTERED NURSE

## 2025-04-21 RX ORDER — FAMOTIDINE 10 MG/ML
20 INJECTION, SOLUTION INTRAVENOUS ONCE AS NEEDED
OUTPATIENT
Start: 2025-07-20

## 2025-04-21 RX ORDER — DIPHENHYDRAMINE HYDROCHLORIDE 50 MG/ML
50 INJECTION, SOLUTION INTRAMUSCULAR; INTRAVENOUS AS NEEDED
OUTPATIENT
Start: 2025-07-20

## 2025-04-21 RX ORDER — EPINEPHRINE 0.3 MG/.3ML
0.3 INJECTION SUBCUTANEOUS EVERY 5 MIN PRN
OUTPATIENT
Start: 2025-07-20

## 2025-04-21 RX ORDER — ALBUTEROL SULFATE 0.83 MG/ML
3 SOLUTION RESPIRATORY (INHALATION) AS NEEDED
OUTPATIENT
Start: 2025-07-20

## 2025-04-21 ASSESSMENT — ENCOUNTER SYMPTOMS
HEMATURIA: 0
MYALGIAS: 0
BRUISES/BLEEDS EASILY: 0
NAUSEA: 1
DYSURIA: 0
FREQUENCY: 0
LIGHT-HEADEDNESS: 0
PALPITATIONS: 0
SORE THROAT: 0
UNEXPECTED WEIGHT CHANGE: 0
WOUND: 0
DIARRHEA: 0
VOMITING: 0
DIZZINESS: 1
FATIGUE: 1
APPETITE CHANGE: 0
FEVER: 0
ABDOMINAL PAIN: 0
ARTHRALGIAS: 0
NUMBNESS: 0
COUGH: 0
EYE PROBLEMS: 0
BLOOD IN STOOL: 0
WHEEZING: 0
EXTREMITY WEAKNESS: 0
CONSTIPATION: 0
HEADACHES: 1
SHORTNESS OF BREATH: 0
VOICE CHANGE: 0
TROUBLE SWALLOWING: 0
LEG SWELLING: 0
CHILLS: 0

## 2025-04-21 NOTE — PATIENT INSTRUCTIONS
Today you received:   Vyepti 100 mg iv infusion.    Next appt 90 days.    For:    1. Chronic migraine without aura without status migrainosus, not intractable            Please read the  Medication Guide that was given to you and reviewed during todays visit.     (Tell all doctors including dentists that you are taking this medication)     Go to the emergency room or call 911 if:  -You have signs of allergic reaction:   o         Rash, hives, itching.   o         Swollen, blistered, peeling skin.   o         Swelling of face, lips, mouth, tongue or throat.   o         Tightness of chest, trouble breathing, swallowing or talking      Call your doctor:     - If IV / injection site gets red, warm, swollen, itchy or leaks fluid or pus.     (Leave dressing on your IV site for at least 2 hours and keep area clean and dry    - If you get sick or have symptoms of infection or are not feeling well for any reason.    (Wash your hands often, stay away from people who are sick)    - If you have side effects from your medication that do not go away or are bothersome.     (Refer to the teaching your nurse gave you for side effects to call your doctor about)     Common side effects may include:  stuffy nose, headache, feeling tired, muscle aches, upset stomach    - Before receiving any vaccines, Call the [facility] at [phone]     - You get sick, are on antibiotics, have had a recent vaccine, have surgery or dental work and your doctor wants your visit rescheduled.    - You need to cancel and reschedule your visit for any reason. Call at least 2 days before your visit if you need to cancel.     - Your insurance changes before your next visit.    (We will need to get approval from your new insurance. This can take up to two weeks.)     The [facility] is opened Monday thru Friday 8am-8pm and Saturday 8am-4:30pm. We are closed on holidays.     Voice mail will take your call if the center is  closed. If you leave a message please allow 24 hours for a call back during weekdays. If you leave a message on a weekend/holiday, we will call you back the next business day.

## 2025-04-21 NOTE — PROGRESS NOTES
[facility]   Infusion Clinic Note   Date: 2025   Name: Kandy Rios  : 1960   MRN: [id number]         Reason for Visit: OP Infusion (Vyepti 100 mg iv infusion. 1st dose.)         Today: We administered eptinezumab (Vyepti) 100 mg in sodium chloride 0.9% 101 mL IV.       Ordered By: Staci Mancini APRN-C*       For a Diagnosis of: Chronic migraine without aura without status migrainosus, not intractable       At today's visit patient accompanied by: Self      Today's Vitals:   Vitals:    25 1355   BP: 146/61   Pulse: 90   Resp: 16   Temp: 36.2 °C (97.2 °F)   SpO2: 98%             Pre - Treatment Checklist:      - Previous reaction to current treatment: n/a      (Assess patient for the concerns below. Document provider notification as appropriate).  - Active or recent infection with/without current antibiotic use: no  - Recent or planned invasive dental work: no  - Recent or planned surgeries: no  - Recently received or plans to receive vaccinations: no  - Has treatment related toxicities: n/a  - Any chance may be pregnant:  no      Pain: 0   - Is the pain different from normal: n/a   - Is prescribing Doctor aware:  n/a      Labs: Reviewed       Fall Risk Screening: Vega Fall Risk  History of Falling, Immediate or Within 3 Months: No  Secondary Diagnosis: No  Ambulatory Aid: Walks without aid/bedrest/nurse assist  Intravenous Therapy/Heparin Lock: Yes  Gait/Transferring: Normal/bedrest/immobile  Mental Status: Oriented to own ability  Vega Fall Risk Score: 20       Review Of Systems:  Review of Systems   Constitutional:  Positive for fatigue. Negative for appetite change, chills, fever and unexpected weight change.   HENT:   Negative for hearing loss, mouth sores, sore throat, tinnitus, trouble swallowing and voice change.    Eyes:  Negative for eye problems.   Respiratory:  Negative for cough, shortness of breath and wheezing.    Cardiovascular:  Negative for chest  pain, leg swelling and palpitations.   Gastrointestinal:  Positive for nausea. Negative for abdominal pain, blood in stool, constipation, diarrhea and vomiting.   Genitourinary:  Negative for dysuria, frequency and hematuria.    Musculoskeletal:  Negative for arthralgias and myalgias.   Skin:  Negative for itching, rash and wound.   Neurological:  Positive for dizziness and headaches (migraines). Negative for extremity weakness, light-headedness and numbness.   Hematological:  Does not bruise/bleed easily.         Infusion Readiness:  - Assessment Concerns Related to Infusion: No  - Provider notified: no      New Patient Education:    NEW PATIENT MEDICATION EDUCATION PT PROVIDED WITH WRITTEN ([employer] PT EDUCATION SHEET) AND VERBAL EDUCATION REGARDING MEDICATION GIVEN. VERIFIED MEDICATION NAME WITH PATIENT AND DISCUSSED REASON FOR USE. BRIEFLY DISCUSSED HOW MEDICATION WORKS AND EDUCATED ON GOAL OF TREATMENT, FREQUENCY OF TREATMENT, ADVERSE RXN'S AND COMMON SIDE EFFECTS TO MONITOR FOR. INSTRUCTED PT TO ASSURE THAT ALL PROVIDERS INCLUDING DENTISTS ARE AWARE OF MEDICATION RECEIVED. DISCUSSED FLOW OF VISIT AND ORIENTED TO INFUSION CENTER. PT VERBALIZES UNDERSTANDING. CALL LIGHT PROVIDED AND PT AWARE TO ALERT STAFF OF ANY CONCERNS DURING TREATMENT.        Treatment Conditions & Drug Specific Questions:    Eptinezumab  (VYEPTI)    (Unless otherwise specified on patient specific therapy plan):     DRUG SPECIFIC QUESTIONS:  Any recent cardiovascular or cerebrovascular ischemic events such as a stroke or heart attack?  No  (if yes hold and notify prescribing provider. Avoid use)     REMINDER:  Recommended Vitals/Observation:  Vitals: Monitor vitals at start and end of infusion, end of monitoring period (1st infusion), and as needed.  Observation: Patient may leave 15 minutes post-infusion for FIRST infusion. Patient may leave immediately upon completion for all subsequent infusions        Weight Based Drug  Calculations:    WEIGHT BASED DRUGS: NOT APPLICABLE / FLAT DOSE       Post Treatment: Patient tolerated treatment without issue and was discharged in no apparent distress.      Note Authored / Patient Cared for By: Demetria Bell RN

## 2025-05-09 DIAGNOSIS — G43.709 CHRONIC MIGRAINE WITHOUT AURA WITHOUT STATUS MIGRAINOSUS, NOT INTRACTABLE: Primary | ICD-10-CM

## 2025-05-09 RX ORDER — PREDNISONE 10 MG/1
TABLET ORAL
Qty: 42 TABLET | Refills: 0 | Status: SHIPPED | OUTPATIENT
Start: 2025-05-09 | End: 2025-05-21

## 2025-05-13 ENCOUNTER — DOCUMENTATION (OUTPATIENT)
Dept: INFUSION THERAPY | Facility: CLINIC | Age: 65
End: 2025-05-13
Payer: MEDICARE

## 2025-05-13 DIAGNOSIS — G43.709 CHRONIC MIGRAINE WITHOUT AURA WITHOUT STATUS MIGRAINOSUS, NOT INTRACTABLE: Primary | ICD-10-CM

## 2025-05-13 RX ORDER — MAGNESIUM SULFATE 1 G/100ML
1 INJECTION INTRAVENOUS ONCE
OUTPATIENT
Start: 2025-05-13

## 2025-05-13 RX ORDER — DIHYDROERGOTAMINE MESYLATE 1 MG/ML
1 INJECTION, SOLUTION INTRAMUSCULAR; INTRAVENOUS; SUBCUTANEOUS ONCE
OUTPATIENT
Start: 2025-05-13

## 2025-05-13 RX ORDER — FAMOTIDINE 10 MG/ML
20 INJECTION, SOLUTION INTRAVENOUS ONCE AS NEEDED
OUTPATIENT
Start: 2025-05-13

## 2025-05-13 RX ORDER — DIPHENHYDRAMINE HYDROCHLORIDE 50 MG/ML
50 INJECTION, SOLUTION INTRAMUSCULAR; INTRAVENOUS ONCE
OUTPATIENT
Start: 2025-05-13

## 2025-05-13 RX ORDER — DIHYDROERGOTAMINE MESYLATE 1 MG/ML
0.5 INJECTION, SOLUTION INTRAMUSCULAR; INTRAVENOUS; SUBCUTANEOUS ONCE
OUTPATIENT
Start: 2025-05-13

## 2025-05-13 RX ORDER — EPINEPHRINE 0.3 MG/.3ML
0.3 INJECTION SUBCUTANEOUS EVERY 5 MIN PRN
OUTPATIENT
Start: 2025-05-13

## 2025-05-13 RX ORDER — ONDANSETRON HYDROCHLORIDE 2 MG/ML
8 INJECTION, SOLUTION INTRAVENOUS ONCE
OUTPATIENT
Start: 2025-05-13

## 2025-05-13 RX ORDER — ALBUTEROL SULFATE 0.83 MG/ML
3 SOLUTION RESPIRATORY (INHALATION) AS NEEDED
OUTPATIENT
Start: 2025-05-13

## 2025-05-13 RX ORDER — KETOROLAC TROMETHAMINE 15 MG/ML
15 INJECTION, SOLUTION INTRAMUSCULAR; INTRAVENOUS ONCE
OUTPATIENT
Start: 2025-05-13

## 2025-05-13 RX ORDER — DIPHENHYDRAMINE HYDROCHLORIDE 50 MG/ML
50 INJECTION, SOLUTION INTRAMUSCULAR; INTRAVENOUS AS NEEDED
OUTPATIENT
Start: 2025-05-13

## 2025-05-13 RX ORDER — METOCLOPRAMIDE HYDROCHLORIDE 5 MG/ML
10 INJECTION INTRAMUSCULAR; INTRAVENOUS ONCE
OUTPATIENT
Start: 2025-05-13

## 2025-05-13 NOTE — PROGRESS NOTES
THERAPY PLAN FOR MIGRAINE PROTOCOL Q28 DAYS PRN     TO GET DHE subcutaneous PRN    PT IS CLEARED TO SCHEDULE     [facility]   [facility]  [address]  [place] [address]  Phone: [phone]

## 2025-05-19 ENCOUNTER — APPOINTMENT (OUTPATIENT)
Dept: INFUSION THERAPY | Facility: CLINIC | Age: 65
End: 2025-05-19
Payer: MEDICARE

## 2025-05-19 VITALS
RESPIRATION RATE: 16 BRPM | TEMPERATURE: 97.5 F | HEART RATE: 74 BPM | OXYGEN SATURATION: 98 % | SYSTOLIC BLOOD PRESSURE: 137 MMHG | DIASTOLIC BLOOD PRESSURE: 77 MMHG

## 2025-05-19 DIAGNOSIS — G43.709 CHRONIC MIGRAINE WITHOUT AURA WITHOUT STATUS MIGRAINOSUS, NOT INTRACTABLE: ICD-10-CM

## 2025-05-19 PROCEDURE — 96366 THER/PROPH/DIAG IV INF ADDON: CPT | Performed by: REGISTERED NURSE

## 2025-05-19 PROCEDURE — 96365 THER/PROPH/DIAG IV INF INIT: CPT | Performed by: REGISTERED NURSE

## 2025-05-19 PROCEDURE — 96375 TX/PRO/DX INJ NEW DRUG ADDON: CPT | Performed by: REGISTERED NURSE

## 2025-05-19 RX ORDER — ONDANSETRON HYDROCHLORIDE 2 MG/ML
8 INJECTION, SOLUTION INTRAVENOUS ONCE
OUTPATIENT
Start: 2025-06-16

## 2025-05-19 RX ORDER — DIHYDROERGOTAMINE MESYLATE 1 MG/ML
1 INJECTION, SOLUTION INTRAMUSCULAR; INTRAVENOUS; SUBCUTANEOUS ONCE
Status: DISCONTINUED | OUTPATIENT
Start: 2025-05-19 | End: 2025-05-19 | Stop reason: HOSPADM

## 2025-05-19 RX ORDER — METOCLOPRAMIDE HYDROCHLORIDE 5 MG/ML
10 INJECTION INTRAMUSCULAR; INTRAVENOUS ONCE
OUTPATIENT
Start: 2025-06-16

## 2025-05-19 RX ORDER — EPINEPHRINE 0.3 MG/.3ML
0.3 INJECTION SUBCUTANEOUS EVERY 5 MIN PRN
OUTPATIENT
Start: 2025-06-16

## 2025-05-19 RX ORDER — FAMOTIDINE 10 MG/ML
20 INJECTION, SOLUTION INTRAVENOUS ONCE AS NEEDED
OUTPATIENT
Start: 2025-06-16

## 2025-05-19 RX ORDER — DIHYDROERGOTAMINE MESYLATE 1 MG/ML
0.5 INJECTION, SOLUTION INTRAMUSCULAR; INTRAVENOUS; SUBCUTANEOUS ONCE
Status: DISCONTINUED | OUTPATIENT
Start: 2025-05-19 | End: 2025-05-19 | Stop reason: HOSPADM

## 2025-05-19 RX ORDER — KETOROLAC TROMETHAMINE 30 MG/ML
15 INJECTION, SOLUTION INTRAMUSCULAR; INTRAVENOUS ONCE
OUTPATIENT
Start: 2025-06-16

## 2025-05-19 RX ORDER — METOCLOPRAMIDE HYDROCHLORIDE 5 MG/ML
10 INJECTION INTRAMUSCULAR; INTRAVENOUS ONCE
Status: COMPLETED | OUTPATIENT
Start: 2025-05-19 | End: 2025-05-19

## 2025-05-19 RX ORDER — ALBUTEROL SULFATE 0.83 MG/ML
3 SOLUTION RESPIRATORY (INHALATION) AS NEEDED
OUTPATIENT
Start: 2025-06-16

## 2025-05-19 RX ORDER — MAGNESIUM SULFATE 1 G/100ML
1 INJECTION INTRAVENOUS ONCE
Status: COMPLETED | OUTPATIENT
Start: 2025-05-19 | End: 2025-05-19

## 2025-05-19 RX ORDER — DIPHENHYDRAMINE HYDROCHLORIDE 50 MG/ML
50 INJECTION, SOLUTION INTRAMUSCULAR; INTRAVENOUS ONCE
Status: COMPLETED | OUTPATIENT
Start: 2025-05-19 | End: 2025-05-19

## 2025-05-19 RX ORDER — DIPHENHYDRAMINE HYDROCHLORIDE 50 MG/ML
50 INJECTION, SOLUTION INTRAMUSCULAR; INTRAVENOUS ONCE
OUTPATIENT
Start: 2025-06-16

## 2025-05-19 RX ORDER — ONDANSETRON HYDROCHLORIDE 2 MG/ML
8 INJECTION, SOLUTION INTRAVENOUS ONCE
Status: COMPLETED | OUTPATIENT
Start: 2025-05-19 | End: 2025-05-19

## 2025-05-19 RX ORDER — DIPHENHYDRAMINE HYDROCHLORIDE 50 MG/ML
50 INJECTION, SOLUTION INTRAMUSCULAR; INTRAVENOUS AS NEEDED
OUTPATIENT
Start: 2025-06-16

## 2025-05-19 RX ORDER — DIHYDROERGOTAMINE MESYLATE 1 MG/ML
0.5 INJECTION, SOLUTION INTRAMUSCULAR; INTRAVENOUS; SUBCUTANEOUS ONCE
OUTPATIENT
Start: 2025-06-16

## 2025-05-19 RX ORDER — KETOROLAC TROMETHAMINE 30 MG/ML
15 INJECTION, SOLUTION INTRAMUSCULAR; INTRAVENOUS ONCE
Status: COMPLETED | OUTPATIENT
Start: 2025-05-19 | End: 2025-05-19

## 2025-05-19 RX ORDER — MAGNESIUM SULFATE 1 G/100ML
1 INJECTION INTRAVENOUS ONCE
OUTPATIENT
Start: 2025-06-16

## 2025-05-19 RX ORDER — DIHYDROERGOTAMINE MESYLATE 1 MG/ML
1 INJECTION, SOLUTION INTRAMUSCULAR; INTRAVENOUS; SUBCUTANEOUS ONCE
OUTPATIENT
Start: 2025-06-16

## 2025-05-19 RX ADMIN — ONDANSETRON HYDROCHLORIDE 8 MG: 2 INJECTION, SOLUTION INTRAVENOUS at 11:17

## 2025-05-19 RX ADMIN — METOCLOPRAMIDE HYDROCHLORIDE 10 MG: 5 INJECTION INTRAMUSCULAR; INTRAVENOUS at 07:23

## 2025-05-19 RX ADMIN — MAGNESIUM SULFATE 1 G: 1 INJECTION INTRAVENOUS at 07:50

## 2025-05-19 RX ADMIN — KETOROLAC TROMETHAMINE 15 MG: 30 INJECTION, SOLUTION INTRAMUSCULAR; INTRAVENOUS at 07:29

## 2025-05-19 RX ADMIN — DIPHENHYDRAMINE HYDROCHLORIDE 50 MG: 50 INJECTION, SOLUTION INTRAMUSCULAR; INTRAVENOUS at 07:26

## 2025-05-19 ASSESSMENT — ENCOUNTER SYMPTOMS
COUGH: 0
ARTHRALGIAS: 0
SORE THROAT: 0
DYSURIA: 0
NERVOUS/ANXIOUS: 1
NUMBNESS: 0
EXTREMITY WEAKNESS: 0
DIARRHEA: 0
FEVER: 0
HEMATURIA: 0
APPETITE CHANGE: 0
EYE PROBLEMS: 0
BRUISES/BLEEDS EASILY: 0
ABDOMINAL PAIN: 0
NAUSEA: 0
UNEXPECTED WEIGHT CHANGE: 0
BLOOD IN STOOL: 0
HEADACHES: 1
CHILLS: 0
SHORTNESS OF BREATH: 0
DIZZINESS: 0
LIGHT-HEADEDNESS: 0
LEG SWELLING: 0
MYALGIAS: 0
CONSTIPATION: 0
VOMITING: 0
TROUBLE SWALLOWING: 0
PALPITATIONS: 0
WOUND: 0
WHEEZING: 0
FREQUENCY: 0
VOICE CHANGE: 0
FATIGUE: 1

## 2025-05-19 ASSESSMENT — PAIN SCALES - GENERAL: PAINLEVEL_OUTOF10: 5

## 2025-05-19 NOTE — PATIENT INSTRUCTIONS
Today :We administered ketorolac, metoclopramide, diphenhydrAMINE, magnesium sulfate, sodium chloride, dexAMETHasone, and (valproate (Depacon) 1,000 mg in sodium chloride 0.9% 50 mL IV).     For:   1. Chronic migraine without aura without status migrainosus, not intractable         Your next appointment is due in:  28 days        Please read the  Medication Guide that was given to you and reviewed during todays visit.     (Tell all doctors including dentists that you are taking this medication)     Go to the emergency room or call 911 if:  -You have signs of allergic reaction:   -Rash, hives, itching.   -Swollen, blistered, peeling skin.   -Swelling of face, lips, mouth, tongue or throat.   -Tightness of chest, trouble breathing, swallowing or talking     Call your doctor:  - If IV / injection site gets red, warm, swollen, itchy or leaks fluid or pus.     (Leave dressing on your IV site for at least 2 hours and keep area clean and dry  - If you get sick or have symptoms of infection or are not feeling well for any reason.    (Wash your hands often, stay away from people who are sick)  - If you have side effects from your medication that do not go away or are bothersome.     (Refer to the teaching your nurse gave you for side effects to call your doctor about)    - Common side effects may include:  stuffy nose, headache, feeling tired, muscle aches, upset stomach  - Before receiving any vaccines     - [facility] at   If:  - You get sick, are on antibiotics, have had a recent vaccine, have surgery or dental work and your doctor wants your visit rescheduled.  - You need to cancel and reschedule your visit for any reason. Call at least 2 days before your visit if you need to cancel.   - Your insurance changes before your next visit.    (We will need to get approval from your new insurance. This can take up to two weeks.)     [facility] is opened Monday thru Friday. We are  closed on weekends and holidays.   Voice mail will take your call if the center is closed. If you leave a message please allow 24 hours for a call back during weekdays. If you leave a message on a weekend/holiday, we will call you back the next business day.    A pharmacist is available Monday - Friday from 8:30AM to 3:30PM to help answer any questions you may have about your prescriptions(s). Please call pharmacy at:    [facility]: [phone]  [facility]: [phone]  [facility]: [phone]

## 2025-05-19 NOTE — PROGRESS NOTES
[facility]   Infusion Clinic Note   Date: May 19, 2025   Name: Kandy Rios  : 1960   MRN: [id number]         Reason for Visit: OP Infusion (Pt here for Q 28 day PRN Migraine protcol)         Today: We administered ketorolac, metoclopramide, diphenhydrAMINE, magnesium sulfate, sodium chloride, dexAMETHasone, (valproate (Depacon) 1,000 mg in sodium chloride 0.9% 50 mL IV), and ondansetron.       Ordered By: Staci Mancini APRN-C*       For a Diagnosis of: Chronic migraine without aura without status migrainosus, not intractable       At today's visit patient accompanied by: Self      Today's Vitals:   Vitals:    25 0655 25 0858 25 1120   BP: 156/82 144/72 137/77   Pulse: 83 74 74   Resp: 17 16 16   Temp: 36.2 °C (97.2 °F) 36.4 °C (97.5 °F) 36.4 °C (97.5 °F)   SpO2: 99% 98% 98%   PainSc:    5    PainLoc:  Head              Pre - Treatment Checklist:      - Previous reaction to current treatment: n/a      (Assess patient for the concerns below. Document provider notification as appropriate).  - Active or recent infection with/without current antibiotic use: no  - Recent or planned invasive dental work: no  - Recent or planned surgeries: n/a  - Recently received or plans to receive vaccinations: no  - Has treatment related toxicities: n/a  - Any chance may be pregnant:  no      Pain: 3   - Is the pain different from normal: no   - Is prescribing Doctor aware:  yes      Labs: N/A      Fall Risk Screening: Vega Fall Risk  History of Falling, Immediate or Within 3 Months: No  Secondary Diagnosis: No  Ambulatory Aid: Walks without aid/bedrest/nurse assist  Intravenous Therapy/Heparin Lock: Yes  Gait/Transferring: Normal/bedrest/immobile  Mental Status: Oriented to own ability  Vega Fall Risk Score: 20       Review Of Systems:  Review of Systems   Constitutional:  Positive for fatigue. Negative for appetite change, chills, fever and unexpected weight change.   HENT:    Negative for hearing loss, mouth sores, sore throat, tinnitus, trouble swallowing and voice change.    Eyes:  Negative for eye problems.   Respiratory:  Negative for cough, shortness of breath and wheezing.    Cardiovascular:  Negative for chest pain, leg swelling and palpitations.   Gastrointestinal:  Negative for abdominal pain, blood in stool, constipation, diarrhea, nausea and vomiting.   Genitourinary:  Negative for dysuria, frequency and hematuria.    Musculoskeletal:  Negative for arthralgias and myalgias.   Skin:  Negative for itching, rash and wound.   Neurological:  Positive for headaches. Negative for dizziness, extremity weakness, light-headedness and numbness.   Hematological:  Does not bruise/bleed easily.   Psychiatric/Behavioral:  The patient is nervous/anxious.          Infusion Readiness:  - Assessment Concerns Related to Infusion: No  - Provider notified: n/a      New Patient Education:    NEW PATIENT MEDICATION EDUCATION PT PROVIDED WITH WRITTEN ([employer] PT EDUCATION SHEET) AND VERBAL EDUCATION REGARDING MEDICATION GIVEN. VERIFIED MEDICATION NAME WITH PATIENT AND DISCUSSED REASON FOR USE. BRIEFLY DISCUSSED HOW MEDICATION WORKS AND EDUCATED ON GOAL OF TREATMENT, FREQUENCY OF TREATMENT, ADVERSE RXN'S AND COMMON SIDE EFFECTS TO MONITOR FOR. INSTRUCTED PT TO ASSURE THAT ALL PROVIDERS INCLUDING DENTISTS ARE AWARE OF MEDICATION RECEIVED. DISCUSSED FLOW OF VISIT AND ORIENTED TO INFUSION CENTER. PT VERBALIZES UNDERSTANDING. CALL LIGHT PROVIDED AND PT AWARE TO ALERT STAFF OF ANY CONCERNS DURING TREATMENT.        Treatment Conditions & Drug Specific Questions:    Migraine Protocol   (Unless otherwise specified on patient specific therapy plan):     TREATMENT CONDITIONS / DRUG SPECIFIC QUESTIONS:    Is my patient appropriate for headache infusion protocol:  ·        Does patient have recurrent headache that interferes with work, family or social function? Yes    ·        Does patient have headaches that  last at least 4 hours if untreated? Yes current migraine lasting more than a week, today it was improved but light triggers it up to a 5     ·        Has patient failed all current home headache treatments? Yes    If yes to these questions, it is appropriate to consider migraine infusion protocol    Evaluate for red flags that would exclude use of headache infusion protocol:  ·        Does patient have new or different headaches in past 6 months? No    ·        Does patient have systemic symptoms: fever, chills, meningitis like symptoms? No see infusion flowsheet    ·        Does patient have secondary risk factor: malignancy, immunosuppression? No    ·        Does the patient have neurologic symptoms or abnormal signs? No see ROS    ·        Was patient with a sudden or abrupt onset of current migraine? No·            -        Is this patient greater than 50 years old? Yes   65 y.o.     ·        Has this patient experience a pattern change in relation to their migraines: first headache or different from previous headache history? No    If YES to any of these questions, HOLD and reach out to prescribing provider prior to proceeding.     Message sent to JELENA Umaña that pt took her sumatriptan yesterday at 8:00 pm. Per Staci Mancini pt to resume her home medications today since she didn't get DHE. She was going to send in a tapered dose of steroid however patient states she can't tolerate.     REMINDERS:  PREGNANCY CATEGORY X DRUG. OBTAIN NEGTATIVE PREGNANCY TEST PRIOR TO FIRST INFUSION FOR WOMEN OF CHILDBEARING ABILITY       Problem List[1]     Patient provided with [employer] patient education sheet prior to administration of sumitriptan and educated about signs and symptoms to monitor for which include the below? Yes     - Signs/symptoms of serotonin syndrome such as mental status changes: agitation, hallucinations, delirium, coma   - Autonomic instability: tachycardia, labile BP, diaphoresis   - Neuromuscular  changes: tremor, rigidity, myoclonus   - GI symptoms: nausea, vomiting, diarrhea  -  Seizures  -  Angina    Reminders:  Monitor blood pressure before and after administration. Monitor for signs and symptoms of angina and escalate to appropriate level of care.             Weight Based Drug Calculations:    WEIGHT BASED DRUGS: NOT APPLICABLE / FLAT DOSE       Post Treatment: Patient tolerated treatment without issue and was discharged in no apparent distress.      Note Authored / Patient Cared for By: Abbie Muhammad RN            [1]   Patient Active Problem List  Diagnosis    Chronic migraine without aura without status migrainosus, not intractable    Trigeminal neuralgia

## 2025-05-27 DIAGNOSIS — G50.0 TRIGEMINAL NEURALGIA: ICD-10-CM

## 2025-05-28 RX ORDER — GABAPENTIN 100 MG/1
CAPSULE ORAL
Qty: 90 CAPSULE | Refills: 1 | Status: SHIPPED | OUTPATIENT
Start: 2025-05-28

## 2025-05-29 DIAGNOSIS — G50.0 TRIGEMINAL NEURALGIA: Primary | ICD-10-CM

## 2025-06-06 ENCOUNTER — APPOINTMENT (OUTPATIENT)
Dept: INFUSION THERAPY | Facility: CLINIC | Age: 65
End: 2025-06-06
Payer: MEDICARE

## 2025-06-16 ENCOUNTER — APPOINTMENT (OUTPATIENT)
Dept: INFUSION THERAPY | Facility: CLINIC | Age: 65
End: 2025-06-16
Payer: MEDICARE

## 2025-06-16 VITALS
TEMPERATURE: 96.3 F | SYSTOLIC BLOOD PRESSURE: 131 MMHG | HEART RATE: 70 BPM | OXYGEN SATURATION: 98 % | DIASTOLIC BLOOD PRESSURE: 78 MMHG | RESPIRATION RATE: 18 BRPM

## 2025-06-16 DIAGNOSIS — G43.709 CHRONIC MIGRAINE WITHOUT AURA WITHOUT STATUS MIGRAINOSUS, NOT INTRACTABLE: ICD-10-CM

## 2025-06-16 PROCEDURE — 96368 THER/DIAG CONCURRENT INF: CPT | Performed by: REGISTERED NURSE

## 2025-06-16 PROCEDURE — 96366 THER/PROPH/DIAG IV INF ADDON: CPT | Performed by: REGISTERED NURSE

## 2025-06-16 PROCEDURE — 96375 TX/PRO/DX INJ NEW DRUG ADDON: CPT | Performed by: REGISTERED NURSE

## 2025-06-16 PROCEDURE — 96365 THER/PROPH/DIAG IV INF INIT: CPT | Performed by: REGISTERED NURSE

## 2025-06-16 PROCEDURE — 96372 THER/PROPH/DIAG INJ SC/IM: CPT | Performed by: REGISTERED NURSE

## 2025-06-16 RX ORDER — EPINEPHRINE 0.3 MG/.3ML
0.3 INJECTION SUBCUTANEOUS EVERY 5 MIN PRN
OUTPATIENT
Start: 2025-07-14

## 2025-06-16 RX ORDER — DIHYDROERGOTAMINE MESYLATE 1 MG/ML
0.5 INJECTION, SOLUTION INTRAMUSCULAR; INTRAVENOUS; SUBCUTANEOUS ONCE
Status: COMPLETED | OUTPATIENT
Start: 2025-06-16 | End: 2025-06-16

## 2025-06-16 RX ORDER — ONDANSETRON HYDROCHLORIDE 2 MG/ML
8 INJECTION, SOLUTION INTRAVENOUS ONCE
OUTPATIENT
Start: 2025-07-14

## 2025-06-16 RX ORDER — DIPHENHYDRAMINE HYDROCHLORIDE 50 MG/ML
50 INJECTION, SOLUTION INTRAMUSCULAR; INTRAVENOUS AS NEEDED
OUTPATIENT
Start: 2025-07-14

## 2025-06-16 RX ORDER — DIHYDROERGOTAMINE MESYLATE 1 MG/ML
1 INJECTION, SOLUTION INTRAMUSCULAR; INTRAVENOUS; SUBCUTANEOUS ONCE
OUTPATIENT
Start: 2025-07-14

## 2025-06-16 RX ORDER — ALBUTEROL SULFATE 0.83 MG/ML
3 SOLUTION RESPIRATORY (INHALATION) AS NEEDED
OUTPATIENT
Start: 2025-07-14

## 2025-06-16 RX ORDER — METOCLOPRAMIDE HYDROCHLORIDE 5 MG/ML
10 INJECTION INTRAMUSCULAR; INTRAVENOUS ONCE
Status: COMPLETED | OUTPATIENT
Start: 2025-06-16 | End: 2025-06-16

## 2025-06-16 RX ORDER — MAGNESIUM SULFATE 1 G/100ML
1 INJECTION INTRAVENOUS ONCE
Status: COMPLETED | OUTPATIENT
Start: 2025-06-16 | End: 2025-06-16

## 2025-06-16 RX ORDER — DIHYDROERGOTAMINE MESYLATE 1 MG/ML
1 INJECTION, SOLUTION INTRAMUSCULAR; INTRAVENOUS; SUBCUTANEOUS ONCE
Status: COMPLETED | OUTPATIENT
Start: 2025-06-16 | End: 2025-06-16

## 2025-06-16 RX ORDER — DIPHENHYDRAMINE HYDROCHLORIDE 50 MG/ML
50 INJECTION, SOLUTION INTRAMUSCULAR; INTRAVENOUS ONCE
OUTPATIENT
Start: 2025-07-14

## 2025-06-16 RX ORDER — DIPHENHYDRAMINE HYDROCHLORIDE 50 MG/ML
50 INJECTION, SOLUTION INTRAMUSCULAR; INTRAVENOUS ONCE
Status: COMPLETED | OUTPATIENT
Start: 2025-06-16 | End: 2025-06-16

## 2025-06-16 RX ORDER — KETOROLAC TROMETHAMINE 30 MG/ML
15 INJECTION, SOLUTION INTRAMUSCULAR; INTRAVENOUS ONCE
OUTPATIENT
Start: 2025-07-14

## 2025-06-16 RX ORDER — FAMOTIDINE 10 MG/ML
20 INJECTION, SOLUTION INTRAVENOUS ONCE AS NEEDED
OUTPATIENT
Start: 2025-07-14

## 2025-06-16 RX ORDER — DIHYDROERGOTAMINE MESYLATE 1 MG/ML
0.5 INJECTION, SOLUTION INTRAMUSCULAR; INTRAVENOUS; SUBCUTANEOUS ONCE
OUTPATIENT
Start: 2025-07-14

## 2025-06-16 RX ORDER — ONDANSETRON HYDROCHLORIDE 2 MG/ML
8 INJECTION, SOLUTION INTRAVENOUS ONCE
Status: COMPLETED | OUTPATIENT
Start: 2025-06-16 | End: 2025-06-16

## 2025-06-16 RX ORDER — KETOROLAC TROMETHAMINE 30 MG/ML
15 INJECTION, SOLUTION INTRAMUSCULAR; INTRAVENOUS ONCE
Status: COMPLETED | OUTPATIENT
Start: 2025-06-16 | End: 2025-06-16

## 2025-06-16 RX ORDER — METOCLOPRAMIDE HYDROCHLORIDE 5 MG/ML
10 INJECTION INTRAMUSCULAR; INTRAVENOUS ONCE
OUTPATIENT
Start: 2025-07-14

## 2025-06-16 RX ORDER — MAGNESIUM SULFATE 1 G/100ML
1 INJECTION INTRAVENOUS ONCE
OUTPATIENT
Start: 2025-07-14

## 2025-06-16 RX ADMIN — MAGNESIUM SULFATE 1 G: 1 INJECTION INTRAVENOUS at 07:30

## 2025-06-16 RX ADMIN — KETOROLAC TROMETHAMINE 15 MG: 30 INJECTION, SOLUTION INTRAMUSCULAR; INTRAVENOUS at 07:10

## 2025-06-16 RX ADMIN — DIPHENHYDRAMINE HYDROCHLORIDE 50 MG: 50 INJECTION, SOLUTION INTRAMUSCULAR; INTRAVENOUS at 07:10

## 2025-06-16 RX ADMIN — DIHYDROERGOTAMINE MESYLATE 0.5 MG: 1 INJECTION, SOLUTION INTRAMUSCULAR; INTRAVENOUS; SUBCUTANEOUS at 10:29

## 2025-06-16 RX ADMIN — DIHYDROERGOTAMINE MESYLATE 0.5 MG: 1 INJECTION, SOLUTION INTRAMUSCULAR; INTRAVENOUS; SUBCUTANEOUS at 10:45

## 2025-06-16 RX ADMIN — DIHYDROERGOTAMINE MESYLATE 1 MG: 1 INJECTION, SOLUTION INTRAMUSCULAR; INTRAVENOUS; SUBCUTANEOUS at 11:00

## 2025-06-16 RX ADMIN — METOCLOPRAMIDE HYDROCHLORIDE 10 MG: 5 INJECTION INTRAMUSCULAR; INTRAVENOUS at 07:10

## 2025-06-16 RX ADMIN — ONDANSETRON HYDROCHLORIDE 8 MG: 2 INJECTION, SOLUTION INTRAVENOUS at 10:25

## 2025-06-16 ASSESSMENT — ENCOUNTER SYMPTOMS
MYALGIAS: 1
LEG SWELLING: 0
CONSTIPATION: 1
SEIZURES: 0
PALPITATIONS: 0
NAUSEA: 1
COUGH: 0
DIZZINESS: 1
WHEEZING: 0
HEADACHES: 1
SHORTNESS OF BREATH: 0
DIARRHEA: 0
VOMITING: 0
WOUND: 0
ARTHRALGIAS: 1

## 2025-06-16 NOTE — PATIENT INSTRUCTIONS
Today :We administered ketorolac, metoclopramide, diphenhydrAMINE, magnesium sulfate, sodium chloride, and (valproate (Depacon) 1,000 mg in sodium chloride 0.9% 50 mL IV).     For:   1. Chronic migraine without aura without status migrainosus, not intractable         Your next appointment is due in:  PRN MONTHLY        Please read the  Medication Guide that was given to you and reviewed during todays visit.     (Tell all doctors including dentists that you are taking this medication)     Go to the emergency room or call 911 if:  -You have signs of allergic reaction:   -Rash, hives, itching.   -Swollen, blistered, peeling skin.   -Swelling of face, lips, mouth, tongue or throat.   -Tightness of chest, trouble breathing, swallowing or talking     Call your doctor:  - If IV / injection site gets red, warm, swollen, itchy or leaks fluid or pus.     (Leave dressing on your IV site for at least 2 hours and keep area clean and dry  - If you get sick or have symptoms of infection or are not feeling well for any reason.    (Wash your hands often, stay away from people who are sick)  - If you have side effects from your medication that do not go away or are bothersome.     (Refer to the teaching your nurse gave you for side effects to call your doctor about)    - Common side effects may include:  stuffy nose, headache, feeling tired, muscle aches, upset stomach  - Before receiving any vaccines     - Call [facility] at   If:  - You get sick, are on antibiotics, have had a recent vaccine, have surgery or dental work and your doctor wants your visit rescheduled.  - You need to cancel and reschedule your visit for any reason. Call at least 2 days before your visit if you need to cancel.   - Your insurance changes before your next visit.    (We will need to get approval from your new insurance. This can take up to two weeks.)     [facility] is opened Monday thru Friday. We are closed on  weekends and holidays.   Voice mail will take your call if the center is closed. If you leave a message please allow 24 hours for a call back during weekdays. If you leave a message on a weekend/holiday, we will call you back the next business day.    A pharmacist is available Monday - Friday from 8:30AM to 3:30PM to help answer any questions you may have about your prescriptions(s). Please call pharmacy at:    [facility]: [phone]  [facility]: [phone]  [facility]: [phone]

## 2025-06-16 NOTE — PROGRESS NOTES
[facility]   Infusion Clinic Note   Date: 2025   Name: Kandy Rios  : 1960   MRN: [id number]         Reason for Visit: OP Infusion (PATIENT HERE FOR MONTHLY PRN MIGRAINE PROTOCOL)         Today: We administered ketorolac, metoclopramide, diphenhydrAMINE, magnesium sulfate, sodium chloride, (valproate (Depacon) 1,000 mg in sodium chloride 0.9% 50 mL IV), ondansetron, dihydroergotamine, dihydroergotamine, and dihydroergotamine.       Ordered By: Staci Mancini APRN-C*       For a Diagnosis of: Chronic migraine without aura without status migrainosus, not intractable       At today's visit patient accompanied by: Self      Today's Vitals:   Vitals:    25 0656 25 0730 25 0845 25 1015   BP: 136/79 141/78 136/74 141/76   Pulse: 80 77 79 82   Resp:  18 18   Temp: 36.2 °C (97.2 °F) 36.3 °C (97.3 °F) 35.9 °C (96.6 °F)    SpO2: 98% 98% 99% 98%    25 1045 25 1100 25 1120   BP: 125/70 125/70 131/78   Pulse: 70 71 70   Resp: 18 18 18   Temp: 35.7 °C (96.3 °F) 35.7 °C (96.3 °F) 35.7 °C (96.3 °F)   SpO2: 99% 98% 98%             Pre - Treatment Checklist:      - Previous reaction to current treatment: no      (Assess patient for the concerns below. Document provider notification as appropriate).  - Active or recent infection with/without current antibiotic use: no  - Recent or planned invasive dental work: no  - Recent or planned surgeries: no  - Recently received or plans to receive vaccinations: no  - Has treatment related toxicities: no  - Any chance may be pregnant:  n/a      Pain: 7 MIGRAINE PAIN   - Is the pain different from normal: no   - Is prescribing Doctor aware:  yes      Labs: Reviewed       Fall Risk Screening: Silvia Fall Risk  History of Falling, Immediate or Within 3 Months: No  Secondary Diagnosis: No  Ambulatory Aid: Walks without aid/bedrest/nurse assist  Intravenous Therapy/Heparin Lock: Yes  Gait/Transferring:  Normal/bedrest/immobile  Mental Status: Oriented to own ability  Vega Fall Risk Score: 20       Review Of Systems:  Review of Systems   Respiratory:  Negative for cough, shortness of breath and wheezing.    Cardiovascular:  Negative for chest pain, leg swelling and palpitations.   Gastrointestinal:  Positive for constipation and nausea. Negative for diarrhea and vomiting.   Musculoskeletal:  Positive for arthralgias and myalgias. Negative for gait problem.   Skin:  Negative for itching, rash and wound.   Neurological:  Positive for dizziness and headaches. Negative for gait problem and seizures.         Infusion Readiness:  - Assessment Concerns Related to Infusion: No  - Provider notified: n/a      New Patient Education:    N/A (returning patient for continuation of therapy. Ongoing education provided as needed.)        Treatment Conditions & Drug Specific Questions:    MIGRAINE PROTOCOL STEP 1 START TIME:  0710  MIGRAINE PAIN 9/10   STEP 2 END TIME: 0730  MIGRAINE PAIN 9/10    STEP 3 START 1025   MIGRAINE PAIN 5/10  END TIME 1115 MIGRAINE END PAIN 5/10     Weight Based Drug Calculations:    WEIGHT BASED DRUGS: NOT APPLICABLE / FLAT DOSE       Post Treatment: Patient tolerated treatment without issue and was discharged in no apparent distress.      Note Authored / Patient Cared for By: Mya Foreman RN

## 2025-06-18 ENCOUNTER — OFFICE VISIT (OUTPATIENT)
Dept: PAIN MEDICINE | Facility: CLINIC | Age: 65
End: 2025-06-18
Payer: MEDICARE

## 2025-06-18 VITALS
WEIGHT: 184 LBS | OXYGEN SATURATION: 99 % | HEIGHT: 64 IN | SYSTOLIC BLOOD PRESSURE: 173 MMHG | TEMPERATURE: 97.7 F | DIASTOLIC BLOOD PRESSURE: 91 MMHG | RESPIRATION RATE: 15 BRPM | BODY MASS INDEX: 31.41 KG/M2 | HEART RATE: 82 BPM

## 2025-06-18 DIAGNOSIS — G50.0 TRIGEMINAL NEURALGIA: ICD-10-CM

## 2025-06-18 DIAGNOSIS — M54.12 CERVICAL RADICULITIS: Primary | ICD-10-CM

## 2025-06-18 DIAGNOSIS — R29.898 RIGHT ARM WEAKNESS: ICD-10-CM

## 2025-06-18 DIAGNOSIS — G43.709 CHRONIC MIGRAINE WITHOUT AURA WITHOUT STATUS MIGRAINOSUS, NOT INTRACTABLE: ICD-10-CM

## 2025-06-18 PROCEDURE — 99215 OFFICE O/P EST HI 40 MIN: CPT | Performed by: ANESTHESIOLOGY

## 2025-06-18 RX ORDER — GABAPENTIN 100 MG/1
CAPSULE ORAL
Qty: 150 CAPSULE | Refills: 11 | Status: SHIPPED | OUTPATIENT
Start: 2025-06-18

## 2025-06-18 ASSESSMENT — PATIENT HEALTH QUESTIONNAIRE - PHQ9
7. TROUBLE CONCENTRATING ON THINGS, SUCH AS READING THE NEWSPAPER OR WATCHING TELEVISION: NEARLY EVERY DAY
9. THOUGHTS THAT YOU WOULD BE BETTER OFF DEAD, OR OF HURTING YOURSELF: NEARLY EVERY DAY
SUM OF ALL RESPONSES TO PHQ9 QUESTIONS 1 AND 2: 5
6. FEELING BAD ABOUT YOURSELF - OR THAT YOU ARE A FAILURE OR HAVE LET YOURSELF OR YOUR FAMILY DOWN: NEARLY EVERY DAY
4. FEELING TIRED OR HAVING LITTLE ENERGY: NEARLY EVERY DAY
3. TROUBLE FALLING OR STAYING ASLEEP OR SLEEPING TOO MUCH: NEARLY EVERY DAY
5. POOR APPETITE OR OVEREATING: MORE THAN HALF THE DAYS
1. LITTLE INTEREST OR PLEASURE IN DOING THINGS: NOT AT ALL
1. LITTLE INTEREST OR PLEASURE IN DOING THINGS: MORE THAN HALF THE DAYS
8. MOVING OR SPEAKING SO SLOWLY THAT OTHER PEOPLE COULD HAVE NOTICED. OR THE OPPOSITE, BEING SO FIGETY OR RESTLESS THAT YOU HAVE BEEN MOVING AROUND A LOT MORE THAN USUAL: NOT AT ALL
2. FEELING DOWN, DEPRESSED OR HOPELESS: NOT AT ALL
2. FEELING DOWN, DEPRESSED OR HOPELESS: NEARLY EVERY DAY
SUM OF ALL RESPONSES TO PHQ9 QUESTIONS 1 AND 2: 0
SUM OF ALL RESPONSES TO PHQ QUESTIONS 1-9: 22

## 2025-06-18 ASSESSMENT — ENCOUNTER SYMPTOMS
LOSS OF SENSATION IN FEET: 0
OCCASIONAL FEELINGS OF UNSTEADINESS: 1

## 2025-06-18 ASSESSMENT — COLUMBIA-SUICIDE SEVERITY RATING SCALE - C-SSRS
4. HAVE YOU HAD THESE THOUGHTS AND HAD SOME INTENTION OF ACTING ON THEM?: NO
2. HAVE YOU ACTUALLY HAD ANY THOUGHTS OF KILLING YOURSELF?: YES
5. HAVE YOU STARTED TO WORK OUT OR WORKED OUT THE DETAILS OF HOW TO KILL YOURSELF? DO YOU INTEND TO CARRY OUT THIS PLAN?: NO
6. HAVE YOU EVER DONE ANYTHING, STARTED TO DO ANYTHING, OR PREPARED TO DO ANYTHING TO END YOUR LIFE?: YES
6. HAVE YOU EVER DONE ANYTHING, STARTED TO DO ANYTHING, OR PREPARED TO DO ANYTHING TO END YOUR LIFE?: NO

## 2025-06-18 ASSESSMENT — PAIN SCALES - GENERAL
PAINLEVEL_OUTOF10: 4
PAINLEVEL_OUTOF10: 4

## 2025-06-18 ASSESSMENT — PAIN - FUNCTIONAL ASSESSMENT: PAIN_FUNCTIONAL_ASSESSMENT: 0-10

## 2025-06-18 NOTE — PROGRESS NOTES
History Of Present Illness  Kandy Rios is a 65 y.o. female presenting with   Chief Complaint   Patient presents with    Pain       Patient presents with complaints of chronic hx of migraine headaches since she was a child. She also reports a 20 + year hx of right sided trigeminal neuralgia. The pain is intermittent, worse occasionally with brushing her teeth or hair, wind and better with Gabapentin 100mg TID. The pain is sharp, stabbing, burning electrical and shooting to the right jaw line and behind her right ear and into her right forehead.  B/L LE. Denies weakness, saddle anesthesia, bowel or bladder incontinence. To manage this pain the patient has attempted monthly infusion therapy for migraine in [place], Aimovig (constipation), Ajovy (wore off after a few years), Emgality (wearing off).  The patients chronic GERD, Depression, Hiatel hernia are stable on medication management.     PAIN SCORE: 7/10.    Ref Neurology: Staci Mancini, CATHERINE       Past Medical History  She has a past medical history of Anxiety, Anxiety disorder, unspecified (06/23/2021), Depression, Headache, Headache, tension-type, Insomnia, Migraine, and Trigeminal neuralgia.    Surgical History  She has a past surgical history that includes Other surgical history (06/23/2021); Other surgical history (06/23/2021); Other surgical history (06/23/2021); Other surgical history (06/23/2021); MR angio head wo IV contrast (07/10/2021); and Microdiscectomy lumbar.     Social History  She reports that she quit smoking about 14 years ago. Her smoking use included cigarettes. She has a 25 pack-year smoking history. She has never used smokeless tobacco. She reports current alcohol use. She reports that she does not use drugs.    Family History  Family History[1]     Allergies  Sulfa (sulfonamide antibiotics) and Sulfasalazine    Review of Systems    All other systems reviewed and negative for any deficits. Pertinent positives and negatives were  considered in the medical decision making process.        Physical Exam  BP (!) 173/91   Pulse 82   Temp 36.5 °C (97.7 °F)   Resp 15   Wt 83.5 kg (184 lb)   SpO2 99%     General: Pt appears stated age    Eyes: Conjunctiva non-icteric and lids without obvious rash or drooping. Pupils are symmetric.    ENT: External ears and nose appear to be without deformity or rash. No lesions or masses noted. Hearing is grossly intact.    Respiratory: No gasping or shortness of breath noted. No use of accessory muscles noted.    CVS: Extremities show no edema or varicosities    Skin: No rashes or open lesions/ulcers identified on skin. No induration/tightening noted with palpation of skin.     Musculoskeletal: [place] is grossly normal.    Stability: No subluxation noted on movement of bilateral upper extremities or head/neck.     Strength: 3/5 in RUE and 4/5 in LUE  Range of Motion: WNL    Neurologic: Reflexes 2+    Sensation: WNL    Neurologic: Cranial Nerves II thru XII are grossly intact    Psychiatric: Pt is alert and oriented to time, person, and place       Assessment/Plan   1. Cervical radiculitis        2. Trigeminal neuralgia  Referral to Pain Medicine      3. Chronic migraine without aura without status migrainosus, not intractable               I would recommend the pt increase her dosage of Gabapentin from 300mg at bedtime to 100/100/300mg to help with nerve related pain. We discussed the risks, benefits, and side effects to this medication including the mechanism of action and the pt understands and agrees.  Follow up with neurosurgereon   Follow up with neuro for Botox treatment  I extensively reviewed the patients MRI findings in detail, including review of the actual images and provided a detailed explanation of the findings using a spine model.   The patient is a candidate for an LESI / EZEQUIEL at L5/S1 / C7/T1 to treat low back / neck and radicular pain. I spent time with the patient discussing the risks,  benefits, and alternatives to this measure including, but not limited to worsening pain with injection, no improvement/guarantee with the procedure, numbness in the lower extremity and risk of falls post procedure, vagal reaction/ hypotension, feeling dizzy / lightheaded, spinal infection, worsening pre-existing infections, epidural hematoma, epidural abscess, nerve injury, paralysis, spinal fluid leak and spinal headache. The patient understands all of these risks and agrees to proceed with the planned procedure.   The pt suffers from cervicogenic headaches and has bilateral UE weakness as well numbness along her C6 dermatome bilaterally. She has failed over 6 weeks of doctor supervised PT and over 6 weeks of Gabapentin, Tylenol, Ibuprofen, Tizanidine with limited relief. I would recommend an MRI of the cervical spine.       I spent time with the patient reviewing their imaging and discussing the risks benefits and alternatives to the above plan. A total of 60 minutes was spent reviewing the data and greater than 50% of that time was with the patient during the face to face encounter discussing treatment options both surgical, non-surgical, and minimally invasive techniques.         Sincerely,     Scooter Arreola MD, FASA  Board Certified Anesthesiologist  Board Certified Pain Management Specialist  Clinical Faculty, Department of Anesthesiology and Perioperative Medicine  [facility] [employer]     [address]  [facility] [address]  [facility]   [address] [place] [id number]    [facility]  [address] [place] [id number]     Phone: [phone]  Fax: [phone]            [1] No family history on file.

## 2025-06-26 ENCOUNTER — HOSPITAL ENCOUNTER (OUTPATIENT)
Dept: RADIOLOGY | Facility: CLINIC | Age: 65
Discharge: HOME | End: 2025-06-26
Payer: MEDICARE

## 2025-06-26 DIAGNOSIS — R29.898 RIGHT ARM WEAKNESS: ICD-10-CM

## 2025-06-26 DIAGNOSIS — M54.12 CERVICAL RADICULITIS: ICD-10-CM

## 2025-06-26 DIAGNOSIS — G50.0 TRIGEMINAL NEURALGIA: ICD-10-CM

## 2025-06-26 DIAGNOSIS — G43.709 CHRONIC MIGRAINE WITHOUT AURA WITHOUT STATUS MIGRAINOSUS, NOT INTRACTABLE: ICD-10-CM

## 2025-06-26 PROCEDURE — 72141 MRI NECK SPINE W/O DYE: CPT | Performed by: RADIOLOGY

## 2025-06-26 PROCEDURE — 72141 MRI NECK SPINE W/O DYE: CPT

## 2025-07-14 ENCOUNTER — APPOINTMENT (OUTPATIENT)
Dept: INFUSION THERAPY | Facility: CLINIC | Age: 65
End: 2025-07-14
Payer: MEDICARE

## 2025-07-16 ENCOUNTER — APPOINTMENT (OUTPATIENT)
Dept: PAIN MEDICINE | Facility: CLINIC | Age: 65
End: 2025-07-16
Payer: MEDICARE

## 2025-07-16 VITALS — HEART RATE: 76 BPM | TEMPERATURE: 97.9 F | OXYGEN SATURATION: 98 % | RESPIRATION RATE: 10 BRPM

## 2025-07-16 DIAGNOSIS — M47.812 CERVICAL SPONDYLOSIS: ICD-10-CM

## 2025-07-16 DIAGNOSIS — M54.2 CERVICALGIA: ICD-10-CM

## 2025-07-16 DIAGNOSIS — M54.12 CERVICAL RADICULITIS: Primary | ICD-10-CM

## 2025-07-16 PROBLEM — K92.2 GASTROINTESTINAL HEMORRHAGE: Status: ACTIVE | Noted: 2025-07-16

## 2025-07-16 PROBLEM — K59.09 CHRONIC CONSTIPATION: Status: ACTIVE | Noted: 2025-07-16

## 2025-07-16 PROBLEM — H54.7 VISION LOSS: Status: ACTIVE | Noted: 2025-07-16

## 2025-07-16 PROBLEM — K21.9 GASTROESOPHAGEAL REFLUX DISEASE: Status: ACTIVE | Noted: 2025-07-16

## 2025-07-16 PROBLEM — H92.01 RIGHT EAR PAIN: Status: ACTIVE | Noted: 2025-07-16

## 2025-07-16 PROBLEM — F32.A DEPRESSION: Status: ACTIVE | Noted: 2025-07-16

## 2025-07-16 PROBLEM — F41.9 ANXIETY: Status: ACTIVE | Noted: 2025-07-16

## 2025-07-16 PROBLEM — J02.9 SORE THROAT: Status: ACTIVE | Noted: 2025-07-16

## 2025-07-16 PROBLEM — M76.31 ILIOTIBIAL BAND SYNDROME OF RIGHT SIDE: Status: ACTIVE | Noted: 2025-07-16

## 2025-07-16 PROBLEM — M76.01 GLUTEAL TENDINITIS OF RIGHT BUTTOCK: Status: ACTIVE | Noted: 2025-07-16

## 2025-07-16 PROBLEM — M46.1 INFLAMMATION OF SACROILIAC JOINT: Status: ACTIVE | Noted: 2025-07-16

## 2025-07-16 PROBLEM — H53.2 DIPLOPIA: Status: ACTIVE | Noted: 2025-07-16

## 2025-07-16 PROBLEM — M54.9 BACK PAIN: Status: ACTIVE | Noted: 2025-07-16

## 2025-07-16 PROBLEM — R63.0 DECREASE IN APPETITE: Status: ACTIVE | Noted: 2025-07-16

## 2025-07-16 PROBLEM — M16.11 OSTEOARTHRITIS OF RIGHT HIP: Status: ACTIVE | Noted: 2025-07-16

## 2025-07-16 PROBLEM — J34.89 SINUS PRESSURE: Status: ACTIVE | Noted: 2025-07-16

## 2025-07-16 PROCEDURE — 99214 OFFICE O/P EST MOD 30 MIN: CPT | Performed by: ANESTHESIOLOGY

## 2025-07-16 ASSESSMENT — PATIENT HEALTH QUESTIONNAIRE - PHQ9
2. FEELING DOWN, DEPRESSED OR HOPELESS: SEVERAL DAYS
1. LITTLE INTEREST OR PLEASURE IN DOING THINGS: NOT AT ALL
SUM OF ALL RESPONSES TO PHQ9 QUESTIONS 1 & 2: 1

## 2025-07-16 ASSESSMENT — PAIN SCALES - GENERAL
PAINLEVEL_OUTOF10: 2
PAINLEVEL_OUTOF10: 2

## 2025-07-16 ASSESSMENT — PAIN - FUNCTIONAL ASSESSMENT: PAIN_FUNCTIONAL_ASSESSMENT: 0-10

## 2025-07-16 ASSESSMENT — LIFESTYLE VARIABLES
HOW MANY STANDARD DRINKS CONTAINING ALCOHOL DO YOU HAVE ON A TYPICAL DAY: PATIENT DOES NOT DRINK
HOW OFTEN DO YOU HAVE SIX OR MORE DRINKS ON ONE OCCASION: NEVER
SKIP TO QUESTIONS 9-10: 1
HOW OFTEN DO YOU HAVE A DRINK CONTAINING ALCOHOL: NEVER
AUDIT-C TOTAL SCORE: 0

## 2025-07-16 ASSESSMENT — ENCOUNTER SYMPTOMS
OCCASIONAL FEELINGS OF UNSTEADINESS: 0
LOSS OF SENSATION IN FEET: 0
DEPRESSION: 0

## 2025-07-16 ASSESSMENT — PAIN DESCRIPTION - DESCRIPTORS: DESCRIPTORS: HEADACHE

## 2025-07-16 NOTE — PROGRESS NOTES
History Of Present Illness  Kandy Rios is a 65 y.o. female presenting with   No chief complaint on file.      Patient presents with complaints of chronic hx of migraine headaches since she was a child. She also reports a 20 + year hx of right sided trigeminal neuralgia. The pain is intermittent, worse occasionally with brushing her teeth or hair, wind and better with Gabapentin 100mg TID. The pain is sharp, stabbing, burning electrical and shooting to the right jaw line and behind her right ear and into her right forehead.  B/L LE. Denies weakness, saddle anesthesia, bowel or bladder incontinence. To manage this pain the patient has attempted monthly infusion therapy for migraine in [place], Aimovig (constipation), Ajovy (wore off after a few years), Emgality (wearing off).  The patients chronic GERD, Depression, Hiatel hernia are stable on medication management.     PAIN SCORE: 7/10.    Ref Neurology: Staci Mancini, CATHERINE       Past Medical History  She has a past medical history of Anxiety, Anxiety disorder, unspecified (06/23/2021), Cancer (Multi), Depression, Endometriosis (1995), Fractures (2005), Headache, Headache, tension-type, Insomnia, Joint pain, Low back pain (1998), Lumbosacral disc disease (1998), Migraine (1973), Neck pain (1990), Osteoarthritis, TMJ dysfunction (1980), and Trigeminal neuralgia (2008).    Surgical History  She has a past surgical history that includes Other surgical history (06/23/2021); Other surgical history (06/23/2021); Other surgical history (06/23/2021); Other surgical history (06/23/2021); MR angio head wo IV contrast (07/10/2021); Microdiscectomy lumbar; Back surgery (1998); Spine surgery (1998); Epidural block injection (2010); orthopedic surgery; and Fracture surgery (2005).     Social History  She reports that she quit smoking about 14 years ago. Her smoking use included cigarettes. She started smoking about 39 years ago. She has a 50 pack-year smoking history.  She has never used smokeless tobacco. She reports current alcohol use. She reports that she does not use drugs.    Family History  Family History[1]     Allergies  Sulfa (sulfonamide antibiotics) and Sulfasalazine    Review of Systems    All other systems reviewed and negative for any deficits. Pertinent positives and negatives were considered in the medical decision making process.        Physical Exam  Pulse 76   Temp 36.6 °C (97.9 °F) (Temporal)   Resp 10   SpO2 98%     General: Pt appears stated age    Eyes: Conjunctiva non-icteric and lids without obvious rash or drooping. Pupils are symmetric.    ENT: External ears and nose appear to be without deformity or rash. No lesions or masses noted. Hearing is grossly intact.    Respiratory: No gasping or shortness of breath noted. No use of accessory muscles noted.    CVS: Extremities show no edema or varicosities    Skin: No rashes or open lesions/ulcers identified on skin. No induration/tightening noted with palpation of skin.     Musculoskeletal: [place] is grossly normal.    Stability: No subluxation noted on movement of bilateral upper extremities or head/neck.     Strength: 3/5 in RUE and 4/5 in LUE  Range of Motion: WNL    Neurologic: Reflexes 2+    Sensation: WNL    Neurologic: Cranial Nerves II thru XII are grossly intact    Psychiatric: Pt is alert and oriented to time, person, and place       Assessment/Plan   1. Cervical radiculitis        2. Cervical spondylosis        3. Cervicalgia             I would recommend the pt increase her dosage of Gabapentin from 300mg at bedtime to 100/100/300mg to help with nerve related pain. We discussed the risks, benefits, and side effects to this medication including the mechanism of action and the pt understands and agrees.  Follow up with neuro surgeon.  Pt has an appt on 7- with Dr. New.   Follow up with neuro for Botox treatment.   I extensively reviewed the patients MRI findings in detail, including review  of the actual images and provided a detailed explanation of the findings using a spine model.   The patient is a candidate for an LESI / EZEQUIEL at L5/S1 / C7/T1 to treat low back / neck and radicular pain. I spent time with the patient discussing the risks, benefits, and alternatives to this measure including, but not limited to worsening pain with injection, no improvement/guarantee with the procedure, numbness in the lower extremity and risk of falls post procedure, vagal reaction/ hypotension, feeling dizzy / lightheaded, spinal infection, worsening pre-existing infections, epidural hematoma, epidural abscess, nerve injury, paralysis, spinal fluid leak and spinal headache. The patient understands all of these risks and agrees to proceed with the planned procedure.   The pt suffers from cervicogenic headaches and has bilateral UE weakness as well numbness along her C6 dermatome bilaterally. She has failed over 6 weeks of doctor supervised PT and over 6 weeks of Gabapentin, Tylenol, Ibuprofen, Tizanidine with limited relief. I would recommend an MRI of the cervical spine.       I spent time with the patient reviewing their imaging and discussing the risks benefits and alternatives to the above plan. A total of 30 minutes was spent reviewing the data and greater than 50% of that time was with the patient during the face to face encounter discussing treatment options both surgical, non-surgical, and minimally invasive techniques.       Sincerely,       Scooter Arreola MD, FASA  Board Certified Anesthesiologist  Board Certified Pain Management Specialist  Clinical Faculty, Department of Anesthesiology and Perioperative Medicine  [facility] [employer] [facility]     [address]  [employer] [place] [id number]     [facility]   [address] [place], [place] [id number]    [address]  [address]  [place] [id number]     Phone: [phone]  Fax: [phone]              [1]   Family History  Problem Relation Name Age of Onset    Osteoporosis Mother Migdalia     Lung disease Mother Migdalia     Heart disease Father Felix     Heart disease Paternal Grandfather Neils     Stroke Maternal Grandfather Darren     Cancer Mother Migdalia     Lung disease Mother Migdalia     Heart disease Father Felix     Heart disease Paternal Grandfather Neils     Stroke Maternal Grandfather Darren

## 2025-07-16 NOTE — PROGRESS NOTES
Pt is here for mri results. Pain level is 2 in neck and shoulders. Takes the gabapentin. Worse in morning and evening.

## 2025-07-18 ENCOUNTER — OFFICE VISIT (OUTPATIENT)
Dept: NEUROSURGERY | Facility: CLINIC | Age: 65
End: 2025-07-18
Payer: MEDICARE

## 2025-07-18 VITALS
DIASTOLIC BLOOD PRESSURE: 77 MMHG | BODY MASS INDEX: 29.16 KG/M2 | SYSTOLIC BLOOD PRESSURE: 137 MMHG | HEIGHT: 65 IN | WEIGHT: 175 LBS | HEART RATE: 79 BPM

## 2025-07-18 DIAGNOSIS — G50.0 SUPRAORBITAL NEURALGIA: Primary | ICD-10-CM

## 2025-07-18 DIAGNOSIS — G50.0 TRIGEMINAL NEURALGIA: ICD-10-CM

## 2025-07-18 DIAGNOSIS — G43.809 MIGRAINE VARIANT: ICD-10-CM

## 2025-07-18 DIAGNOSIS — M54.81 OCCIPITAL NEURALGIA OF RIGHT SIDE: ICD-10-CM

## 2025-07-18 DIAGNOSIS — G50.0 INFRAORBITAL NEURALGIA: ICD-10-CM

## 2025-07-18 PROCEDURE — 1125F AMNT PAIN NOTED PAIN PRSNT: CPT | Performed by: NEUROLOGICAL SURGERY

## 2025-07-18 PROCEDURE — 99215 OFFICE O/P EST HI 40 MIN: CPT | Performed by: NEUROLOGICAL SURGERY

## 2025-07-18 PROCEDURE — 3008F BODY MASS INDEX DOCD: CPT | Performed by: NEUROLOGICAL SURGERY

## 2025-07-18 PROCEDURE — 1159F MED LIST DOCD IN RCRD: CPT | Performed by: NEUROLOGICAL SURGERY

## 2025-07-18 PROCEDURE — 99205 OFFICE O/P NEW HI 60 MIN: CPT | Performed by: NEUROLOGICAL SURGERY

## 2025-07-18 ASSESSMENT — PAIN SCALES - GENERAL: PAINLEVEL_OUTOF10: 3

## 2025-07-18 NOTE — PROGRESS NOTES
[facility]   Neurosurgery    Diagnosis  Kandy was seen today for new patient visit.  Diagnoses and all orders for this visit:  Supraorbital neuralgia  Trigeminal neuralgia  -     Referral to Neurology; Future  -     Cancel: Referral to Psychology; Future  -     MR angio head wo IV contrast; Future  -     MR brain w and wo IV contrast; Future  -     Referral to Psychology; Future  -     XR cervical spine complete 4-5 views; Future  Migraine variant  Infraorbital neuralgia  Occipital neuralgia of right side  Other orders  -     Referral to Neurosurgery      Patient Discussion/Summary  1) I would like you to get an MRI of the brain with the MVD protocol to look for evidence of neurovascular compression on the right trigeminal nerve.     2) I would like you to have cervical flexion-extension x-rays to look for instability of the spine which may cause symptoms of occipital neuralgia.      3) I would like to give you a referral to Dr. Kylie Bolton and/or Dr. Ángel Tee to fully address you head and facial pain, to help determine if this is a true migraine pain or perhaps a neuralgia type of pain.  They can also help manage your complicated history of medications.     4) I would like you to see Dr. Ya from a pain psychology evaluation, as this is needed prior to any surgery that could be done, but it may also help your underlying depression and anxiety related to your chronic, debilitating pain.     5) I would like you to keep a log of your pain in much more detail so that we have a better understanding of triggers, location/distribution, etc.     6) I would like to see you again following some of the above imaging and/or evaluations to discuss options further. We can do this either virtually or in person.  Yet, today, we discussed that you may be a candidate for a trial of supraorbital, infraorbital, occipital nerve stimulation. We discussed the nature of doing this trial followed by an implant, if the  trial is successful. The pros and cons of the procedure were described.     8) Finally, we discussed the the treatment options for trigeminal neuralgia for your right lower jaw pain.  However, given that you are not having classic type Ia distribution trigeminal neuralgia, regardless of which treatment we choose, the outcomes are slightly worse compared to the type Ia trigeminal neuralgia.  In addition, I recommend first addressing the migraine/SON/ION/ON pain prior to doing treatment for the trigeminal neuralgia type pain, given that the former is the type of pain that is the most bothersome to you and affecting your overall quality of life at this time.     Provider Impressions  65-year-old female with complex longstanding head and facial pain history.  Patient has had migraines for most of her life mostly affecting the right side of her head and face primarily in the periorbital region on the right and also in the occipital region on the right.  Patient's migraines are very debilitating and severely refractory to most medications patient finally after most of her life had some years of good coverage of her pain with Emgality and other newer medications, however these are no longer covered by her insurance and now she is having refractory migraines again up to 20 a month that can be very debilitating.  Of note, the patient calls these migraines, however, they are really painful tenderness in the region of the right supraorbital nerve infraorbital nerve and even in the right lesser occipital nerve and greater occipital nerve.  Sometimes the pain can go up to the top of the forehead.  As such, it is possible that this is a supraorbital, infraorbital, occipital neuralgia either superimposed on the migraines or a separate diagnosis and that the migraines are not true migraines.  Patient has not had any nerve blocks to these regions before and on exam she does have a positive Tinel in the supraorbital nerve  "infraorbital nerve and slightly positive on the lesser occipital nerve all on the right side.  In addition patient has had decades of pain separate from her migraine pain that is in the right V3 distribution consistent with a type Ib versus to a trigeminal neuralgia pain.  This pain has been somewhat controlled with high dose gabapentin, but the gabapentin dose had to be so high and the patient was not functioning for many years.  This pain is severe stabbing pain but can linger for hours to days.  Although this pain is debilitating, it is not as frequent or severe as her \"migraine\" pain.  Interestingly, patient does not appear to have any MRIs to look for true trigeminal neuralgia.    At this time, I recommend the followin) I would like the patient to get an MRI of the brain with the MVD protocol to look for evidence of neurovascular compression on the right trigeminal nerve. 2) I would like the patient to have cervical flexion-extension x-rays to look for instability of the spine which may cause symptoms of occipital neuralgia.  3)I would like to give the patient a referral to Dr. Kylie Bolton and/or Dr. Ángel Tee to fully address her head and facial pain, to help determine if this is a true migraine pain or perhaps a neuralgia type of pain.  They can also help manage her complicated history of medications. 4) I would like the patient to see Dr. Ya from a pain psychology evaluation, as this is needed prior to any surgery that could be done, but it may also help her underlying depression and anxiety related to her chronic, debilitating pain. 5) I would like the patient to keep a log of her pain in much more detail so that we have a better understanding of triggers, location/distribution, etc. 6) I would like to see the patient again following some of the above imaging and/or evaluations to discuss options further. 7) Yet, today we discussed that the patient may be a candidate for a trial of " supraorbital, infraorbital, occipital nerve stimulation. We discussed the nature of doing this trial followed by an implant if the trial is successful.  The pros and cons of the procedure were described. 8) We also discussed the the treatment options for trigeminal neuralgia for her right lower jaw pain.  However, given that she is not having classic type Ia distribution trigeminal neuralgia, regardless of which treatment we choose, the outcomes are slightly limited compared to the type Ia.  In addition, I recommend first addressing the migraine/SON/ION/ON pain prior to doing treatment for the trigeminal neuralgia type pain, given that the former is the type of pain that is the most bothersome and affecting her overall quality of life at this time.     History of Present Illness  Chief Complaint:   Chief Complaint   Patient presents with    New Patient Visit         HPI: Kandy Rios is a 65 y.o. female with complex pain history of long standing migraine type headaches since her 20s, diagnosis of right-sided TN around 2005, as well as long standing neck and back pain, referred here by neurology, Staci Mancini NP, for neurosurgical evaluation and to discuss surgical options. Patient has been on medications for decades, without consistent control of pain.  She was recently evaluated by pain management, Dr. Arreola, and discussed receiving steroid injections of the cervical and lumbar spine. She also recently started Botox injections.  Of note, patient has followed with multiple neurology providers throughout the years, currently Gill Mancini, and has been trialed on multiple medications, including but not limited to OTC analgesics, Neurontin, Prozac, Zanaflex, Xanax, Clonazepam, Imitrex, zolmitriptan, Emgality, Zomig, metoprolol, amitriptyline, topiramate, fluoxetine, Aimovig, Emgality, and Nurtec, all with minimal/transient benefit or side effects. She is currently maintained on Depakote, Vyepti, and sumtriptan.         Pt has h/o migraine h/a that have affected her right-side head/face for most of her life, that respond most of the time to meds. Then around late 1990s, pt had new kind of pain that was sharp, stabbing pain in R ear and R low jaw, V3 distribution; pain would come and stay a few days and then go away for a while; pain was triggered by touch, cold air, sleep, etc. Pt saw dentists, pulled teeth, root canal, and nothing seemed to help. Pt ultimately went neurology and was told she had TN and was put on carbamazepine, but did not tolerate side effects, so switched to gabapentin, which helped for a while. Pt would then have good control for a while, then sx returned so would increase dose, which helped a while longer, etc. Pt stayed on this course for a while, in addition to migraine medications. At some point, around 2010, pt was on too many meds and sleeping all the time and was having a hard time functioning, so pt started weaning gabapentin on her own and her pain came back in same V3 distribution. So pt would go back on/off for many years. Of note, pt has baseline TMJ from tooth grinding severely, that also contributed to her sx. So around 2015 pt retired and pt switched to other meds like NSAIDs, Tylenol, PT, cognitive behavioral therapy, acupuncture, chiropractic care, etc. Some of which helped some worsened. Pt also had new meds for her migraines, which seemed to also help TN sx. So up to last year, she was in pretty good condition. Then in Jan 2025, pt went on medicare and then stopped covering her emgality migraine med, and pt has been in bad shape since then with both V3 pain and migraines. By April 2025, pt was having about 20 migraines a month in R side of head/face, which help to some degree with migraine meds and dark, quiet room. Pt was also having regular V3 jaw pain but was on only low dose gabapentin. Pt saw Dr. Arreola just recently for her pain, and he increased her gabapentin to 100mg morning,  "afternoon, 300mg evening, which has helped with her V3 pain slightly but makes her sleepy.     Currently, pt experiences pain almost daily from migraines in R face, specifically around R eye and forehead, and also in back of head in R KEN and BESSIE distribution and into neck periodically; at its worst, that pain is 10/10 and will lead to n/v; on avg that pain is 6/10. The pain in R V3 is a couple times a week, lasting 2hours -2days, now slightly controlled with gabapentin, and spreads to the back of neck; at its worst, this pain is 10/10, but on avg 2-3/10.         ROS: As noted in HPI.      Previous History  Medical History[1]  Surgical History[2]  Social History[3]  Family History[4]  Allergies[5]  Current Outpatient Medications   Medication Instructions    cetirizine (ZyrTEC) 10 mg tablet 1 tablet, Daily    clonazePAM (KLONOPIN) 0.5 mg, 2 times daily    esomeprazole (NEXIUM) 20 mg, Daily before breakfast    FLUoxetine (PROZAC) 50 mg, Daily    gabapentin (Neurontin) 100 mg capsule Take 1 capsule by mouth in the morning, 1 capsule in the afternoon and 3 capsules at bedtime daily.    SUMAtriptan (IMITREX) 100 mg, oral, As needed, May repeat dose once in 2 hours if no relief.  Do not exceed 2 doses in 24 hours.    tiZANidine (ZANAFLEX) 4 mg, 3 times daily    Vyepti 100 mg, intravenous, Every 3 months    ZOLMitriptan (Zomig) 2.5 mg tablet TAKE 1 TABLET BY MOUTH AT ONSET OF HEADACHE MAY REPEAT IN 2 HOURS IF HEADACHE PERSISTS MAXIMUM DAILY DOSE OF 2 TABLETS PER 24 HOURS         Vitals  /77   Pulse 79   Ht 1.651 m (5' 5\")   Wt 79.4 kg (175 lb)   BMI 29.12 kg/m²       Physical Exam  Constitutional: Well appearing. No acute distress.   Musculoskeletal: No visible deformity of joints and nails. Normal ROM.  Orientation: Oriented to self, place, and time  Language: Fluid speech and intact cognition  CN 2: Normal   CN 3, 4, and 6: Normal   CN 5: Normal   CN 7: Normal   CN 8: Normal   CN 9 and 10: Normal   CN 11: " Normal   CN 12: Normal   Muscle strength: 5/5 strength both UE and LE, except LLE df is 4/5  Muscle tone: No atrophy, abnormal movements, flaccidity, cogwheeling or spasticity.   Gait and station: Non-antalgic and not broad-based. No shuffling steps.  Sensation: Grossly intact throughout all dermatomes. No allodynia.   Reflexes: Symmetric and normal deep tendon reflexes throughout. Negative Esteban's sign. No clonus at ankles.  Coordination: No dysmetria.  Mood and Affect: Appropriate mood and affect.   Involuntary Movements:   Radicular Testing: positive tinel of R SON and ION and weakly positive over R BESSIE      Results                         [1]   Past Medical History:  Diagnosis Date    Anxiety     Anxiety disorder, unspecified 2021    Anxiety and depression    Cancer (Multi)     Depression     Endometriosis 1995    Fractures     Headache     Headache, tension-type     Insomnia     Joint pain     Low back pain 1998    Lumbosacral disc disease 1998    Migraine 1973    Neck pain     Osteoarthritis     TMJ dysfunction     Trigeminal neuralgia    [2]   Past Surgical History:  Procedure Laterality Date    BACK SURGERY      EPIDURAL BLOCK INJECTION      FRACTURE SURGERY      MICRODISCECTOMY LUMBAR      1998    MR HEAD ANGIO WO IV CONTRAST  07/10/2021    MR HEAD ANGIO WO IV CONTRAST 7/10/2021 [facility]    ORTHOPEDIC SURGERY      OTHER SURGICAL HISTORY  2021    Microdiscectomy    OTHER SURGICAL HISTORY  2021    Cholecystectomy    OTHER SURGICAL HISTORY  2021    Hysterectomy    OTHER SURGICAL HISTORY  2021    Ankle fracture repair    SPINE SURGERY     [3]   Social History  Tobacco Use    Smoking status: Former     Current packs/day: 0.00     Average packs/day: 1 pack/day for 50.0 years (50.0 ttl pk-yrs)     Types: Cigarettes     Start date: 1986     Quit date: 2011     Years since quittin.5    Smokeless tobacco: Never   Substance Use  Topics    Alcohol use: Yes     Comment: maybe a couple a month or less    Drug use: Never   [4]   Family History  Problem Relation Name Age of Onset    Osteoporosis Mother Migdalia     Lung disease Mother Migdalia     Heart disease Father Felix     Heart disease Paternal Grandfather Neils     Stroke Maternal Grandfather Darren     Cancer Mother Migdalia     Lung disease Mother Migdalia     Heart disease Father Felix     Heart disease Paternal Grandfather Neils     Stroke Maternal Grandfather Darren    [5]   Allergies  Allergen Reactions    Sulfa (Sulfonamide Antibiotics) Hives    Sulfasalazine Unknown

## 2025-07-22 ENCOUNTER — APPOINTMENT (OUTPATIENT)
Dept: INFUSION THERAPY | Facility: CLINIC | Age: 65
End: 2025-07-22
Payer: MEDICARE

## 2025-07-22 DIAGNOSIS — G43.709 CHRONIC MIGRAINE WITHOUT AURA WITHOUT STATUS MIGRAINOSUS, NOT INTRACTABLE: ICD-10-CM

## 2025-07-23 ENCOUNTER — APPOINTMENT (OUTPATIENT)
Dept: INFUSION THERAPY | Facility: CLINIC | Age: 65
End: 2025-07-23
Payer: MEDICARE

## 2025-07-23 VITALS
OXYGEN SATURATION: 97 % | SYSTOLIC BLOOD PRESSURE: 131 MMHG | HEART RATE: 85 BPM | RESPIRATION RATE: 16 BRPM | DIASTOLIC BLOOD PRESSURE: 75 MMHG | TEMPERATURE: 97 F

## 2025-07-23 DIAGNOSIS — G43.709 CHRONIC MIGRAINE WITHOUT AURA WITHOUT STATUS MIGRAINOSUS, NOT INTRACTABLE: ICD-10-CM

## 2025-07-23 PROCEDURE — 96365 THER/PROPH/DIAG IV INF INIT: CPT | Performed by: REGISTERED NURSE

## 2025-07-23 RX ORDER — FAMOTIDINE 10 MG/ML
20 INJECTION, SOLUTION INTRAVENOUS ONCE AS NEEDED
OUTPATIENT
Start: 2025-10-18

## 2025-07-23 RX ORDER — EPINEPHRINE 0.3 MG/.3ML
0.3 INJECTION SUBCUTANEOUS EVERY 5 MIN PRN
OUTPATIENT
Start: 2025-10-18

## 2025-07-23 RX ORDER — ALBUTEROL SULFATE 0.83 MG/ML
3 SOLUTION RESPIRATORY (INHALATION) AS NEEDED
OUTPATIENT
Start: 2025-10-18

## 2025-07-23 RX ORDER — DIPHENHYDRAMINE HYDROCHLORIDE 50 MG/ML
50 INJECTION, SOLUTION INTRAMUSCULAR; INTRAVENOUS AS NEEDED
OUTPATIENT
Start: 2025-10-18

## 2025-07-23 ASSESSMENT — ENCOUNTER SYMPTOMS
EYE PROBLEMS: 0
ARTHRALGIAS: 0
PALPITATIONS: 0
CONSTIPATION: 0
BRUISES/BLEEDS EASILY: 0
LIGHT-HEADEDNESS: 0
WHEEZING: 0
COUGH: 0
LEG SWELLING: 0
HEMATURIA: 0
DIARRHEA: 0
FEVER: 0
FREQUENCY: 0
NUMBNESS: 0
TROUBLE SWALLOWING: 0
EXTREMITY WEAKNESS: 0
VOICE CHANGE: 0
FATIGUE: 0
DYSURIA: 0
ABDOMINAL PAIN: 0
CHILLS: 0
NAUSEA: 0
DIZZINESS: 0
VOMITING: 0
HEADACHES: 0
BLOOD IN STOOL: 0
SORE THROAT: 0
SHORTNESS OF BREATH: 0
MYALGIAS: 0
APPETITE CHANGE: 0
WOUND: 0
UNEXPECTED WEIGHT CHANGE: 0

## 2025-07-23 NOTE — PROGRESS NOTES
[facility]   Infusion Clinic Note   Date: 2025   Name: Kandy Rios  : 1960   MRN: [id number]         Reason for Visit: OP Infusion (Patient is here for Vyepti 100mg infusion q90 days.  )         Today: We administered eptinezumab (Vyepti) 100 mg in sodium chloride 0.9% 101 mL IV.       Ordered By: Staci Mancini APRN-C*       For a Diagnosis of: Chronic migraine without aura without status migrainosus, not intractable       At today's visit patient accompanied by: Self      Today's Vitals:   Vitals:    25 1453 25 1550   BP: 142/80 131/75   Pulse: 71 85   Resp: 16 16   Temp: 36.1 °C (97 °F)    SpO2: 98% 97%             Pre - Treatment Checklist:      - Previous reaction to current treatment: no      (Assess patient for the concerns below. Document provider notification as appropriate).  - Active or recent infection with/without current antibiotic use: no  - Recent or planned invasive dental work: no  - Recent or planned surgeries: no  - Recently received or plans to receive vaccinations: no  - Has treatment related toxicities: no  - Any chance may be pregnant:  n/a      Pain: 0   - Is the pain different from normal: no   - Is prescribing Doctor aware:  n/a      Labs: Reviewed       Fall Risk Screening: Silvia Fall Risk  History of Falling, Immediate or Within 3 Months: No  Secondary Diagnosis: No  Ambulatory Aid: Walks without aid/bedrest/nurse assist  Intravenous Therapy/Heparin Lock: Yes  Gait/Transferring: Normal/bedrest/immobile  Mental Status: Oriented to own ability  Vega Fall Risk Score: 20            Review Of Systems:  Review of Systems   Constitutional:  Negative for appetite change, chills, fatigue, fever and unexpected weight change.   HENT:   Negative for hearing loss, mouth sores, sore throat, tinnitus, trouble swallowing and voice change.    Eyes:  Negative for eye problems.   Respiratory:  Negative for cough, shortness of breath and wheezing.     Cardiovascular:  Negative for chest pain, leg swelling and palpitations.   Gastrointestinal:  Negative for abdominal pain, blood in stool, constipation, diarrhea, nausea and vomiting.   Genitourinary:  Negative for dysuria, frequency and hematuria.    Musculoskeletal:  Negative for arthralgias and myalgias.   Skin:  Negative for itching, rash and wound.   Neurological:  Negative for dizziness, extremity weakness, headaches, light-headedness and numbness.   Hematological:  Does not bruise/bleed easily.         Infusion Readiness:  - Assessment Concerns Related to Infusion: No  - Provider notified: no      New Patient Education:    N/A (returning patient for continuation of therapy. Ongoing education provided as needed.)        Treatment Conditions & Drug Specific Questions:    Eptinezumab  (VYEPTI)    (Unless otherwise specified on patient specific therapy plan):     DRUG SPECIFIC QUESTIONS:  Any recent cardiovascular or cerebrovascular ischemic events such as a stroke or heart attack?  No  (if yes hold and notify prescribing provider. Avoid use)     REMINDER:  Recommended Vitals/Observation:  Vitals: Monitor vitals at start and end of infusion, end of monitoring period (1st infusion), and as needed.  Observation: Patient may leave 15 minutes post-infusion for FIRST infusion. Patient may leave immediately upon completion for all subsequent infusions        Weight Based Drug Calculations:    WEIGHT BASED DRUGS: NOT APPLICABLE / FLAT DOSE       Post Treatment: Patient tolerated treatment without issue and was discharged in no apparent distress.      Note Authored / Patient Cared for By: Matilde Michaels RN

## 2025-07-23 NOTE — PATIENT INSTRUCTIONS
Today you received: Vyepti 100 mg infusion q90 days.    For:    1. Chronic migraine without aura without status migrainosus, not intractable            Please read the  Medication Guide that was given to you and reviewed during todays visit.     (Tell all doctors including dentists that you are taking this medication)     Go to the emergency room or call 911 if:  -You have signs of allergic reaction:   o         Rash, hives, itching.   o         Swollen, blistered, peeling skin.   o         Swelling of face, lips, mouth, tongue or throat.   o         Tightness of chest, trouble breathing, swallowing or talking      Call your doctor:     - If IV / injection site gets red, warm, swollen, itchy or leaks fluid or pus.     (Leave dressing on your IV site for at least 2 hours and keep area clean and dry    - If you get sick or have symptoms of infection or are not feeling well for any reason.    (Wash your hands often, stay away from people who are sick)    - If you have side effects from your medication that do not go away or are bothersome.     (Refer to the teaching your nurse gave you for side effects to call your doctor about)     Common side effects may include:  stuffy nose, headache, feeling tired, muscle aches, upset stomach    - Before receiving any vaccines, Call the [facility] at [phone]     - You get sick, are on antibiotics, have had a recent vaccine, have surgery or dental work and your doctor wants your visit rescheduled.    - You need to cancel and reschedule your visit for any reason. Call at least 2 days before your visit if you need to cancel.     - Your insurance changes before your next visit.    (We will need to get approval from your new insurance. This can take up to two weeks.)     The [facility] is opened Monday thru Friday 8am-8pm and Saturday 8am-4:30pm. We are closed on holidays.     Voice mail will take your call if the center is closed. If you leave a  message please allow 24 hours for a call back during weekdays. If you leave a message on a weekend/holiday, we will call you back the next business day.

## 2025-07-28 RX ORDER — SUMATRIPTAN SUCCINATE 100 MG/1
TABLET ORAL
Qty: 9 TABLET | Refills: 5 | Status: SHIPPED | OUTPATIENT
Start: 2025-07-28

## 2025-07-28 RX ORDER — ZOLMITRIPTAN 2.5 MG/1
TABLET, FILM COATED ORAL
Qty: 6 TABLET | Refills: 5 | Status: SHIPPED | OUTPATIENT
Start: 2025-07-28

## 2025-08-04 ENCOUNTER — APPOINTMENT (OUTPATIENT)
Dept: NEUROLOGY | Facility: CLINIC | Age: 65
End: 2025-08-04
Payer: MEDICARE

## 2025-08-05 ENCOUNTER — HOSPITAL ENCOUNTER (OUTPATIENT)
Dept: RADIOLOGY | Facility: CLINIC | Age: 65
End: 2025-08-05
Payer: MEDICARE

## 2025-08-06 ENCOUNTER — TELEMEDICINE (OUTPATIENT)
Dept: NEUROLOGY | Facility: CLINIC | Age: 65
End: 2025-08-06
Payer: MEDICARE

## 2025-08-06 DIAGNOSIS — G43.709 CHRONIC MIGRAINE WITHOUT AURA WITHOUT STATUS MIGRAINOSUS, NOT INTRACTABLE: Primary | ICD-10-CM

## 2025-08-06 PROCEDURE — 1159F MED LIST DOCD IN RCRD: CPT | Performed by: NURSE PRACTITIONER

## 2025-08-06 PROCEDURE — 1160F RVW MEDS BY RX/DR IN RCRD: CPT | Performed by: NURSE PRACTITIONER

## 2025-08-06 PROCEDURE — 99214 OFFICE O/P EST MOD 30 MIN: CPT | Performed by: NURSE PRACTITIONER

## 2025-08-06 RX ORDER — ALBUTEROL SULFATE 0.83 MG/ML
3 SOLUTION RESPIRATORY (INHALATION) AS NEEDED
OUTPATIENT
Start: 2025-08-06

## 2025-08-06 RX ORDER — EPTINEZUMAB-JJMR 100 MG/ML
300 INJECTION INTRAVENOUS
Qty: 3 ML | Refills: 2 | Status: SHIPPED
Start: 2025-08-06

## 2025-08-06 RX ORDER — FAMOTIDINE 10 MG/ML
20 INJECTION, SOLUTION INTRAVENOUS ONCE AS NEEDED
OUTPATIENT
Start: 2025-08-06

## 2025-08-06 RX ORDER — ZOLMITRIPTAN 2.5 MG/1
TABLET, FILM COATED ORAL
Qty: 6 TABLET | Refills: 5 | Status: SHIPPED | OUTPATIENT
Start: 2025-08-06

## 2025-08-06 RX ORDER — SUMATRIPTAN SUCCINATE 100 MG/1
100 TABLET ORAL AS NEEDED
Qty: 9 TABLET | Refills: 5 | Status: SHIPPED | OUTPATIENT
Start: 2025-08-06

## 2025-08-06 RX ORDER — EPINEPHRINE 0.3 MG/.3ML
0.3 INJECTION SUBCUTANEOUS EVERY 5 MIN PRN
OUTPATIENT
Start: 2025-08-06

## 2025-08-06 RX ORDER — DIPHENHYDRAMINE HYDROCHLORIDE 50 MG/ML
50 INJECTION, SOLUTION INTRAMUSCULAR; INTRAVENOUS AS NEEDED
OUTPATIENT
Start: 2025-08-06

## 2025-08-06 NOTE — PROGRESS NOTES
Virtual or Telephone Consent    An interactive audio and video telecommunication system which permits real time communications between the patient (at the originating site) and provider (at the distant site) was utilized to provide this telehealth service.   Verbal consent was requested and obtained from Kandy Rios on this date, 08/06/25 for a telehealth visit and the patient's location was confirmed at the time of the visit.    Subjective   HPI  Kandy Rios is a 65 y.o. year old female who presents with chief complaint Chronic migraine without aura without status migrainosus, not intractable [G43.709]    Kandy is experiencing  12-15 HA days per month, 12-15 of the HAs meet migraine criteria.   The headaches are usually moderate, dull, pounding, and throbbing and are located above right eye or occiput, generally unilateral.   Generally, headaches last about 1 hours in duration. If sumatriptan effective.  Associated photophobia, phonophobia, nausea, and vestibular symptoms  The headaches are not positional.   Headaches are worsened with exertion. No change in character with sneezing, coughing and bending over.   The current rescue medication Sumatriptan  oral (Imitrex ) and Zolmitriptan (Zomig) starts to take effect within 1    hours and decreases the headache by 100%. The patient will have to repeat treatment with rescue medication.  Patient reports zolmitriptan is not as effective and will typically utilize the sumatriptan.  Will use the sumatriptan if she is able to catch it early enough on it and the migraine.  Work attendance or other daily activities affected:  is unable to participate  Caffeine:  1-2 cups of coffee per day  Sleep:  4-6 hours per night  Denies other neurological history of seizures, stroke, or TIA.      Current/ past HA treatments:  Preventive:  Metoprolol  Amitriptyline  Topiramate  Fluoxetine  Gabapentin  Aimovig  Emgality  Nurtec  Vyepti 100  mg    Rescue:  Acetaminophen  Ibuprofen  Sumatriptan  Zolmitriptan    Current Medications[1]    RX Allergies[2]    Medical History[3]  Surgical History[4]  Family History[5]  Social History     Tobacco Use    Smoking status: Former     Current packs/day: 0.00     Average packs/day: 1 pack/day for 50.0 years (50.0 ttl pk-yrs)     Types: Cigarettes     Start date: 1986     Quit date: 2011     Years since quittin.6    Smokeless tobacco: Never   Substance Use Topics    Alcohol use: Yes     Comment: maybe a couple a month or less     Social History     Substance and Sexual Activity   Drug Use Never        ROS  As noted in HPI, otherwise all other systems have been reviewed are negative for complaint.     Objective   General Appearance: Kandy is well-developed, well-nourished, 65 y.o. year old female, in no acute distress. Makes good eye contact, is alert, interactive, and cooperative. Demonstrates recent & remote memory recall. Subjective information consistent with objective assessment.       Lab Results   Component Value Date    WBC 8.8 2023    RBC 4.46 2023    HGB 13.8 2023    HCT 41.5 2023     2023    QWTOVBRQ93 994 (H) 2024    HGBA1C 5.8 (H) 2025    TSH 1.11 2023        Neurological Exam  Cranial Nerves  CN VII: Full and symmetric facial movement.  CN VIII: Hearing is normal.    RECORDS REVIEW:  Previous records (provider notes, laboratory reports, and imaging studies were reviewed and summarized).    Previous neuronote reviewed.  Labs reviewed.    Assessment & Plan  Chronic migraine without aura without status migrainosus, not intractable    Orders:    SUMAtriptan (Imitrex) 100 mg tablet; Take 1 tablet (100 mg) by mouth if needed for migraine. TAKE 1 TABLET BY MOUTH AS NEEDED FOR MIGRAINE. MAY REPEAT DOSE ONCE IN 2 HOURS IF NO RELIEF. DO NOT EXCEED 2 DOSES IN 24 HOURS    ZOLMitriptan (Zomig) 2.5 mg tablet; TAKE ONE TABLET BY MOUTH AT ONSET OF  HEADACHE. MAY REPEAT IN 2 HOURS IF HEADACHE PERSISTS. MAXIMUM DAILY DOSE OF 2 TABLETS PER 24 HOURS    Follow Up In Neurology; Future    eptinezumab (Vyepti) injection; Infuse 3 mL (300 mg total) into a venous catheter every 3 months.         ASSESSMENT/PLAN:  Increase Vyepti dose to 300 mg every 3 months for preventative treatment of migraine.  Continue sumatriptan 100 mg for abortive treatment of migraine.  Continue zolmitriptan for abortive treatment of migraine.  Follow-up in February 2026.        Staci Mancini, APRN-CNP         [1]   Current Outpatient Medications:     cetirizine (ZyrTEC) 10 mg tablet, Take 1 tablet (10 mg) by mouth once daily., Disp: , Rfl:     clonazePAM (KlonoPIN) 0.5 mg tablet, Take 1 tablet (0.5 mg) by mouth 2 times a day., Disp: , Rfl:     eptinezumab (Vyepti) injection, Infuse 1 mL (100 mg total) into a venous catheter every 3 months., Disp: 1 mL, Rfl: 1    esomeprazole (NexIUM) 20 mg DR capsule, Take 1 capsule (20 mg) by mouth once daily in the morning. Take before meals. Do not open capsule., Disp: , Rfl:     FLUoxetine (PROzac) 40 mg capsule, Take 50 mg by mouth once daily., Disp: , Rfl:     gabapentin (Neurontin) 100 mg capsule, Take 1 capsule by mouth in the morning, 1 capsule in the afternoon and 3 capsules at bedtime daily., Disp: 150 capsule, Rfl: 11    SUMAtriptan (Imitrex) 100 mg tablet, TAKE 1 TABLET BY MOUTH AS NEEDED FOR MIGRAINE. MAY REPEAT DOSE ONCE IN 2 HOURS IF NO RELIEF. DO NOT EXCEED 2 DOSES IN 24 HOURS, Disp: 9 tablet, Rfl: 5    tiZANidine (Zanaflex) 4 mg tablet, Take 1 tablet (4 mg) by mouth 3 times a day., Disp: , Rfl:     ZOLMitriptan (Zomig) 2.5 mg tablet, TAKE ONE TABLET BY MOUTH AT ONSET OF HEADACHE. MAY REPEAT IN 2 HOURS IF HEADACHE PERSISTS. MAXIMUM DAILY DOSE OF 2 TABLETS PER 24 HOURS, Disp: 6 tablet, Rfl: 5  [2]   Allergies  Allergen Reactions    Sulfa (Sulfonamide Antibiotics) Hives    Sulfasalazine Unknown   [3]   Past Medical History:  Diagnosis Date     Allergic     Anxiety 1990    Anxiety disorder, unspecified 06/23/2021    Anxiety and depression    Cancer (Multi)     Depression 1990    Endometriosis 1995    Fractures 2005    GERD (gastroesophageal reflux disease) 1999    Headache     Headache, tension-type     Insomnia     Joint pain     Low back pain 1998    Lumbosacral disc disease 1998    Migraine 1973    Neck pain 1990    Osteoarthritis     Skin cancer 2003    TMJ dysfunction 1980    Trigeminal neuralgia 2008   [4]   Past Surgical History:  Procedure Laterality Date    BACK SURGERY  1998    CHOLECYSTECTOMY  2015    COLONOSCOPY  2023    EPIDURAL BLOCK INJECTION  2010    EXPLORATORY LAPAROTOMY  1995    FRACTURE SURGERY  2005    HYSTERECTOMY  1995    MICRODISCECTOMY LUMBAR  1998    1998    MR HEAD ANGIO WO IV CONTRAST  07/10/2021    MR HEAD ANGIO WO IV CONTRAST 7/10/2021 [facility]    OOPHORECTOMY  1995    ORTHOPEDIC SURGERY      OTHER SURGICAL HISTORY  06/23/2021    Microdiscectomy    OTHER SURGICAL HISTORY  06/23/2021    Cholecystectomy    OTHER SURGICAL HISTORY  06/23/2021    Hysterectomy    OTHER SURGICAL HISTORY  06/23/2021    Ankle fracture repair    SPINE SURGERY  1998    UPPER GASTROINTESTINAL ENDOSCOPY  2023   [5]   Family History  Problem Relation Name Age of Onset    Osteoporosis Mother Migdalia     Lung disease Mother Migdalia     Heart disease Father Felix     Heart disease Paternal Grandfather Neils     Stroke Maternal Grandfather Darren     Cancer Mother Migdalia     Lung disease Mother Migdalia     Heart disease Father Felix     Heart disease Paternal Grandfather Neils     Stroke Maternal Grandfather Darren

## 2025-08-06 NOTE — ASSESSMENT & PLAN NOTE
Orders:    SUMAtriptan (Imitrex) 100 mg tablet; Take 1 tablet (100 mg) by mouth if needed for migraine. TAKE 1 TABLET BY MOUTH AS NEEDED FOR MIGRAINE. MAY REPEAT DOSE ONCE IN 2 HOURS IF NO RELIEF. DO NOT EXCEED 2 DOSES IN 24 HOURS    ZOLMitriptan (Zomig) 2.5 mg tablet; TAKE ONE TABLET BY MOUTH AT ONSET OF HEADACHE. MAY REPEAT IN 2 HOURS IF HEADACHE PERSISTS. MAXIMUM DAILY DOSE OF 2 TABLETS PER 24 HOURS    Follow Up In Neurology; Future    eptinezumab (Vyepti) injection; Infuse 3 mL (300 mg total) into a venous catheter every 3 months.

## 2025-08-07 ENCOUNTER — HOSPITAL ENCOUNTER (OUTPATIENT)
Dept: RADIOLOGY | Facility: HOSPITAL | Age: 65
Discharge: HOME | End: 2025-08-07
Payer: MEDICARE

## 2025-08-07 DIAGNOSIS — G50.0 TRIGEMINAL NEURALGIA: ICD-10-CM

## 2025-08-07 PROCEDURE — 70544 MR ANGIOGRAPHY HEAD W/O DYE: CPT

## 2025-08-07 PROCEDURE — A9575 INJ GADOTERATE MEGLUMI 0.1ML: HCPCS | Performed by: NEUROLOGICAL SURGERY

## 2025-08-07 PROCEDURE — 72050 X-RAY EXAM NECK SPINE 4/5VWS: CPT

## 2025-08-07 PROCEDURE — 70553 MRI BRAIN STEM W/O & W/DYE: CPT

## 2025-08-07 PROCEDURE — 2550000001 HC RX 255 CONTRASTS: Performed by: NEUROLOGICAL SURGERY

## 2025-08-07 PROCEDURE — 72050 X-RAY EXAM NECK SPINE 4/5VWS: CPT | Performed by: RADIOLOGY

## 2025-08-07 RX ORDER — GADOTERATE MEGLUMINE 376.9 MG/ML
16 INJECTION INTRAVENOUS
Status: COMPLETED | OUTPATIENT
Start: 2025-08-07 | End: 2025-08-07

## 2025-08-07 RX ADMIN — GADOTERATE MEGLUMINE 16 ML: 376.9 INJECTION INTRAVENOUS at 16:17

## 2025-08-12 ENCOUNTER — HOSPITAL ENCOUNTER (OUTPATIENT)
Dept: PAIN MEDICINE | Facility: CLINIC | Age: 65
Discharge: HOME | End: 2025-08-12
Payer: MEDICARE

## 2025-08-12 VITALS
RESPIRATION RATE: 18 BRPM | TEMPERATURE: 98.4 F | HEIGHT: 65 IN | SYSTOLIC BLOOD PRESSURE: 139 MMHG | HEART RATE: 86 BPM | OXYGEN SATURATION: 98 % | DIASTOLIC BLOOD PRESSURE: 78 MMHG | WEIGHT: 175 LBS | BODY MASS INDEX: 29.16 KG/M2

## 2025-08-12 DIAGNOSIS — M54.12 CERVICAL RADICULITIS: ICD-10-CM

## 2025-08-12 PROCEDURE — 7100000010 HC PHASE TWO TIME - EACH INCREMENTAL 1 MINUTE

## 2025-08-12 PROCEDURE — 2500000005 HC RX 250 GENERAL PHARMACY W/O HCPCS: Performed by: ANESTHESIOLOGY

## 2025-08-12 PROCEDURE — 2500000004 HC RX 250 GENERAL PHARMACY W/ HCPCS (ALT 636 FOR OP/ED): Performed by: ANESTHESIOLOGY

## 2025-08-12 PROCEDURE — 62321 NJX INTERLAMINAR CRV/THRC: CPT | Performed by: ANESTHESIOLOGY

## 2025-08-12 PROCEDURE — 7100000009 HC PHASE TWO TIME - INITIAL BASE CHARGE

## 2025-08-12 RX ORDER — DEXAMETHASONE SODIUM PHOSPHATE 10 MG/ML
INJECTION INTRAMUSCULAR; INTRAVENOUS AS NEEDED
Status: COMPLETED | OUTPATIENT
Start: 2025-08-12 | End: 2025-08-12

## 2025-08-12 RX ORDER — SODIUM CHLORIDE 9 MG/ML
INJECTION, SOLUTION INTRAMUSCULAR; INTRAVENOUS; SUBCUTANEOUS AS NEEDED
Status: COMPLETED | OUTPATIENT
Start: 2025-08-12 | End: 2025-08-12

## 2025-08-12 RX ORDER — LIDOCAINE HYDROCHLORIDE 5 MG/ML
INJECTION, SOLUTION INFILTRATION; INTRAVENOUS AS NEEDED
Status: COMPLETED | OUTPATIENT
Start: 2025-08-12 | End: 2025-08-12

## 2025-08-12 RX ADMIN — LIDOCAINE HYDROCHLORIDE 10 ML: 5 INJECTION, SOLUTION INFILTRATION at 09:49

## 2025-08-12 RX ADMIN — SODIUM CHLORIDE 10 ML: 9 INJECTION, SOLUTION INTRAMUSCULAR; INTRAVENOUS; SUBCUTANEOUS at 09:50

## 2025-08-12 RX ADMIN — DEXAMETHASONE SODIUM PHOSPHATE 10 MG: 10 INJECTION, SOLUTION INTRAMUSCULAR; INTRAVENOUS at 09:49

## 2025-08-12 ASSESSMENT — PAIN SCALES - GENERAL: PAINLEVEL_OUTOF10: 3

## 2025-08-12 ASSESSMENT — PAIN - FUNCTIONAL ASSESSMENT: PAIN_FUNCTIONAL_ASSESSMENT: 0-10

## 2025-08-12 ASSESSMENT — PAIN DESCRIPTION - DESCRIPTORS: DESCRIPTORS: ACHING

## 2025-10-02 ENCOUNTER — APPOINTMENT (OUTPATIENT)
Dept: NEUROLOGY | Facility: CLINIC | Age: 65
End: 2025-10-02
Payer: MEDICARE

## 2025-10-23 ENCOUNTER — APPOINTMENT (OUTPATIENT)
Dept: INFUSION THERAPY | Facility: CLINIC | Age: 65
End: 2025-10-23
Payer: MEDICARE